# Patient Record
Sex: FEMALE | Race: WHITE | NOT HISPANIC OR LATINO | URBAN - METROPOLITAN AREA
[De-identification: names, ages, dates, MRNs, and addresses within clinical notes are randomized per-mention and may not be internally consistent; named-entity substitution may affect disease eponyms.]

---

## 2022-10-19 ENCOUNTER — INPATIENT (INPATIENT)
Facility: HOSPITAL | Age: 87
LOS: 8 days | Discharge: NURSING, REHAB OR LONG-TERM CARE | End: 2022-10-28
Attending: STUDENT IN AN ORGANIZED HEALTH CARE EDUCATION/TRAINING PROGRAM | Admitting: STUDENT IN AN ORGANIZED HEALTH CARE EDUCATION/TRAINING PROGRAM

## 2022-10-19 VITALS
RESPIRATION RATE: 16 BRPM | OXYGEN SATURATION: 98 % | DIASTOLIC BLOOD PRESSURE: 59 MMHG | SYSTOLIC BLOOD PRESSURE: 100 MMHG | HEART RATE: 42 BPM

## 2022-10-19 DIAGNOSIS — R00.1 BRADYCARDIA, UNSPECIFIED: ICD-10-CM

## 2022-10-19 DIAGNOSIS — I48.91 UNSPECIFIED ATRIAL FIBRILLATION: ICD-10-CM

## 2022-10-19 DIAGNOSIS — Z29.9 ENCOUNTER FOR PROPHYLACTIC MEASURES, UNSPECIFIED: ICD-10-CM

## 2022-10-19 DIAGNOSIS — Z90.89 ACQUIRED ABSENCE OF OTHER ORGANS: Chronic | ICD-10-CM

## 2022-10-19 DIAGNOSIS — E03.9 HYPOTHYROIDISM, UNSPECIFIED: ICD-10-CM

## 2022-10-19 DIAGNOSIS — L98.429 NON-PRESSURE CHRONIC ULCER OF BACK WITH UNSPECIFIED SEVERITY: ICD-10-CM

## 2022-10-19 DIAGNOSIS — T14.8XXA OTHER INJURY OF UNSPECIFIED BODY REGION, INITIAL ENCOUNTER: ICD-10-CM

## 2022-10-19 DIAGNOSIS — D62 ACUTE POSTHEMORRHAGIC ANEMIA: ICD-10-CM

## 2022-10-19 DIAGNOSIS — Z90.710 ACQUIRED ABSENCE OF BOTH CERVIX AND UTERUS: Chronic | ICD-10-CM

## 2022-10-19 DIAGNOSIS — Z90.49 ACQUIRED ABSENCE OF OTHER SPECIFIED PARTS OF DIGESTIVE TRACT: Chronic | ICD-10-CM

## 2022-10-19 LAB
ALBUMIN SERPL ELPH-MCNC: 3.3 G/DL — SIGNIFICANT CHANGE UP (ref 3.3–5)
ALP SERPL-CCNC: 88 U/L — SIGNIFICANT CHANGE UP (ref 40–120)
ALT FLD-CCNC: 23 U/L — SIGNIFICANT CHANGE UP (ref 4–33)
ANION GAP SERPL CALC-SCNC: 12 MMOL/L — SIGNIFICANT CHANGE UP (ref 7–14)
ANION GAP SERPL CALC-SCNC: 7 MMOL/L — SIGNIFICANT CHANGE UP (ref 7–14)
APTT BLD: 31.1 SEC — SIGNIFICANT CHANGE UP (ref 27–36.3)
AST SERPL-CCNC: 41 U/L — HIGH (ref 4–32)
BASE EXCESS BLDV CALC-SCNC: -4.8 MMOL/L — LOW (ref -2–3)
BASOPHILS # BLD AUTO: 0.19 K/UL — SIGNIFICANT CHANGE UP (ref 0–0.2)
BASOPHILS NFR BLD AUTO: 1.2 % — SIGNIFICANT CHANGE UP (ref 0–2)
BILIRUB SERPL-MCNC: 0.8 MG/DL — SIGNIFICANT CHANGE UP (ref 0.2–1.2)
BLD GP AB SCN SERPL QL: NEGATIVE — SIGNIFICANT CHANGE UP
BUN SERPL-MCNC: 34 MG/DL — HIGH (ref 7–23)
BUN SERPL-MCNC: 35 MG/DL — HIGH (ref 7–23)
CA-I SERPL-SCNC: 1.25 MMOL/L — SIGNIFICANT CHANGE UP (ref 1.15–1.33)
CALCIUM SERPL-MCNC: 9.6 MG/DL — SIGNIFICANT CHANGE UP (ref 8.4–10.5)
CALCIUM SERPL-MCNC: 9.8 MG/DL — SIGNIFICANT CHANGE UP (ref 8.4–10.5)
CHLORIDE BLDV-SCNC: 108 MMOL/L — SIGNIFICANT CHANGE UP (ref 96–108)
CHLORIDE SERPL-SCNC: 109 MMOL/L — HIGH (ref 98–107)
CHLORIDE SERPL-SCNC: 111 MMOL/L — HIGH (ref 98–107)
CO2 BLDV-SCNC: 23 MMOL/L — SIGNIFICANT CHANGE UP (ref 22–26)
CO2 SERPL-SCNC: 22 MMOL/L — SIGNIFICANT CHANGE UP (ref 22–31)
CO2 SERPL-SCNC: 23 MMOL/L — SIGNIFICANT CHANGE UP (ref 22–31)
CREAT SERPL-MCNC: 0.92 MG/DL — SIGNIFICANT CHANGE UP (ref 0.5–1.3)
CREAT SERPL-MCNC: 0.97 MG/DL — SIGNIFICANT CHANGE UP (ref 0.5–1.3)
EGFR: 57 ML/MIN/1.73M2 — LOW
EGFR: 61 ML/MIN/1.73M2 — SIGNIFICANT CHANGE UP
EOSINOPHIL # BLD AUTO: 0.38 K/UL — SIGNIFICANT CHANGE UP (ref 0–0.5)
EOSINOPHIL NFR BLD AUTO: 2.4 % — SIGNIFICANT CHANGE UP (ref 0–6)
FLUAV AG NPH QL: SIGNIFICANT CHANGE UP
FLUBV AG NPH QL: SIGNIFICANT CHANGE UP
GAS PNL BLDV: 135 MMOL/L — LOW (ref 136–145)
GAS PNL BLDV: SIGNIFICANT CHANGE UP
GLUCOSE BLDV-MCNC: 93 MG/DL — SIGNIFICANT CHANGE UP (ref 70–99)
GLUCOSE SERPL-MCNC: 112 MG/DL — HIGH (ref 70–99)
GLUCOSE SERPL-MCNC: 98 MG/DL — SIGNIFICANT CHANGE UP (ref 70–99)
HCO3 BLDV-SCNC: 22 MMOL/L — SIGNIFICANT CHANGE UP (ref 22–29)
HCT VFR BLD CALC: 32.1 % — LOW (ref 34.5–45)
HCT VFR BLDA CALC: 30 % — LOW (ref 34.5–46.5)
HGB BLD CALC-MCNC: 10 G/DL — LOW (ref 11.5–15.5)
HGB BLD-MCNC: 9.7 G/DL — LOW (ref 11.5–15.5)
IANC: 12.27 K/UL — HIGH (ref 1.8–7.4)
IMM GRANULOCYTES NFR BLD AUTO: 0.9 % — SIGNIFICANT CHANGE UP (ref 0–0.9)
INR BLD: 1.63 RATIO — HIGH (ref 0.88–1.16)
LACTATE BLDV-MCNC: 2.4 MMOL/L — HIGH (ref 0.5–2)
LYMPHOCYTES # BLD AUTO: 1.87 K/UL — SIGNIFICANT CHANGE UP (ref 1–3.3)
LYMPHOCYTES # BLD AUTO: 11.6 % — LOW (ref 13–44)
MAGNESIUM SERPL-MCNC: 1.7 MG/DL — SIGNIFICANT CHANGE UP (ref 1.6–2.6)
MCHC RBC-ENTMCNC: 26.1 PG — LOW (ref 27–34)
MCHC RBC-ENTMCNC: 30.2 GM/DL — LOW (ref 32–36)
MCV RBC AUTO: 86.3 FL — SIGNIFICANT CHANGE UP (ref 80–100)
MONOCYTES # BLD AUTO: 1.25 K/UL — HIGH (ref 0–0.9)
MONOCYTES NFR BLD AUTO: 7.8 % — SIGNIFICANT CHANGE UP (ref 2–14)
NEUTROPHILS # BLD AUTO: 12.27 K/UL — HIGH (ref 1.8–7.4)
NEUTROPHILS NFR BLD AUTO: 76.1 % — SIGNIFICANT CHANGE UP (ref 43–77)
NRBC # BLD: 0 /100 WBCS — SIGNIFICANT CHANGE UP (ref 0–0)
NRBC # FLD: 0 K/UL — SIGNIFICANT CHANGE UP (ref 0–0)
PCO2 BLDV: 45 MMHG — HIGH (ref 39–42)
PH BLDV: 7.29 — LOW (ref 7.32–7.43)
PLATELET # BLD AUTO: 321 K/UL — SIGNIFICANT CHANGE UP (ref 150–400)
PO2 BLDV: 35 MMHG — SIGNIFICANT CHANGE UP
POTASSIUM BLDV-SCNC: 7.2 MMOL/L — CRITICAL HIGH (ref 3.5–5.1)
POTASSIUM SERPL-MCNC: 4.2 MMOL/L — SIGNIFICANT CHANGE UP (ref 3.5–5.3)
POTASSIUM SERPL-MCNC: 4.5 MMOL/L — SIGNIFICANT CHANGE UP (ref 3.5–5.3)
POTASSIUM SERPL-SCNC: 4.2 MMOL/L — SIGNIFICANT CHANGE UP (ref 3.5–5.3)
POTASSIUM SERPL-SCNC: 4.5 MMOL/L — SIGNIFICANT CHANGE UP (ref 3.5–5.3)
PROT SERPL-MCNC: 6.1 G/DL — SIGNIFICANT CHANGE UP (ref 6–8.3)
PROTHROM AB SERPL-ACNC: 19 SEC — HIGH (ref 10.5–13.4)
RBC # BLD: 3.72 M/UL — LOW (ref 3.8–5.2)
RBC # FLD: 15.8 % — HIGH (ref 10.3–14.5)
RH IG SCN BLD-IMP: POSITIVE — SIGNIFICANT CHANGE UP
RSV RNA NPH QL NAA+NON-PROBE: SIGNIFICANT CHANGE UP
SAO2 % BLDV: 55 % — SIGNIFICANT CHANGE UP
SARS-COV-2 RNA SPEC QL NAA+PROBE: SIGNIFICANT CHANGE UP
SODIUM SERPL-SCNC: 141 MMOL/L — SIGNIFICANT CHANGE UP (ref 135–145)
SODIUM SERPL-SCNC: 143 MMOL/L — SIGNIFICANT CHANGE UP (ref 135–145)
WBC # BLD: 16.11 K/UL — HIGH (ref 3.8–10.5)
WBC # FLD AUTO: 16.11 K/UL — HIGH (ref 3.8–10.5)

## 2022-10-19 PROCEDURE — 11042 DBRDMT SUBQ TIS 1ST 20SQCM/<: CPT | Mod: GC

## 2022-10-19 PROCEDURE — 99232 SBSQ HOSP IP/OBS MODERATE 35: CPT | Mod: GC,25

## 2022-10-19 PROCEDURE — G1004: CPT

## 2022-10-19 PROCEDURE — 99223 1ST HOSP IP/OBS HIGH 75: CPT | Mod: GC

## 2022-10-19 PROCEDURE — 99233 SBSQ HOSP IP/OBS HIGH 50: CPT

## 2022-10-19 PROCEDURE — 99285 EMERGENCY DEPT VISIT HI MDM: CPT

## 2022-10-19 PROCEDURE — 73706 CT ANGIO LWR EXTR W/O&W/DYE: CPT | Mod: 26,50,52,ME

## 2022-10-19 RX ORDER — ACETAMINOPHEN 500 MG
1 TABLET ORAL
Qty: 0 | Refills: 0 | DISCHARGE

## 2022-10-19 RX ORDER — GABAPENTIN 400 MG/1
1 CAPSULE ORAL
Qty: 0 | Refills: 0 | DISCHARGE

## 2022-10-19 RX ORDER — ALPRAZOLAM 0.25 MG
0.25 TABLET ORAL
Refills: 0 | Status: DISCONTINUED | OUTPATIENT
Start: 2022-10-19 | End: 2022-10-19

## 2022-10-19 RX ORDER — GABAPENTIN 400 MG/1
100 CAPSULE ORAL THREE TIMES A DAY
Refills: 0 | Status: DISCONTINUED | OUTPATIENT
Start: 2022-10-19 | End: 2022-10-28

## 2022-10-19 RX ORDER — CALCIUM GLUCONATE 100 MG/ML
2 VIAL (ML) INTRAVENOUS ONCE
Refills: 0 | Status: COMPLETED | OUTPATIENT
Start: 2022-10-19 | End: 2022-10-19

## 2022-10-19 RX ORDER — ALPRAZOLAM 0.25 MG
1 TABLET ORAL
Qty: 0 | Refills: 0 | DISCHARGE

## 2022-10-19 RX ORDER — SENNA PLUS 8.6 MG/1
2 TABLET ORAL AT BEDTIME
Refills: 0 | Status: DISCONTINUED | OUTPATIENT
Start: 2022-10-19 | End: 2022-10-28

## 2022-10-19 RX ORDER — ZINC OXIDE 200 MG/G
1 OINTMENT TOPICAL
Qty: 0 | Refills: 0 | DISCHARGE

## 2022-10-19 RX ORDER — ROSUVASTATIN CALCIUM 5 MG/1
1 TABLET ORAL
Qty: 0 | Refills: 0 | DISCHARGE

## 2022-10-19 RX ORDER — BRIMONIDINE TARTRATE 2 MG/MG
1 SOLUTION/ DROPS OPHTHALMIC
Qty: 0 | Refills: 0 | DISCHARGE

## 2022-10-19 RX ORDER — PSYLLIUM SEED (WITH DEXTROSE)
1 POWDER (GRAM) ORAL
Qty: 0 | Refills: 0 | DISCHARGE

## 2022-10-19 RX ORDER — SODIUM CHLORIDE 9 MG/ML
500 INJECTION, SOLUTION INTRAVENOUS ONCE
Refills: 0 | Status: COMPLETED | OUTPATIENT
Start: 2022-10-19 | End: 2022-10-19

## 2022-10-19 RX ORDER — KETOROLAC TROMETHAMINE 0.5 %
1 DROPS OPHTHALMIC (EYE)
Qty: 0 | Refills: 0 | DISCHARGE

## 2022-10-19 RX ORDER — ACETAMINOPHEN 500 MG
650 TABLET ORAL EVERY 6 HOURS
Refills: 0 | Status: DISCONTINUED | OUTPATIENT
Start: 2022-10-19 | End: 2022-10-28

## 2022-10-19 RX ORDER — DORZOLAMIDE HYDROCHLORIDE 20 MG/ML
1 SOLUTION/ DROPS OPHTHALMIC ONCE
Refills: 0 | Status: COMPLETED | OUTPATIENT
Start: 2022-10-19 | End: 2022-10-19

## 2022-10-19 RX ORDER — DORZOLAMIDE HYDROCHLORIDE 20 MG/ML
1 SOLUTION/ DROPS OPHTHALMIC THREE TIMES A DAY
Refills: 0 | Status: DISCONTINUED | OUTPATIENT
Start: 2022-10-19 | End: 2022-10-28

## 2022-10-19 RX ORDER — INSULIN HUMAN 100 [IU]/ML
5 INJECTION, SOLUTION SUBCUTANEOUS ONCE
Refills: 0 | Status: DISCONTINUED | OUTPATIENT
Start: 2022-10-19 | End: 2022-10-19

## 2022-10-19 RX ORDER — LOSARTAN POTASSIUM 100 MG/1
1 TABLET, FILM COATED ORAL
Qty: 0 | Refills: 0 | DISCHARGE

## 2022-10-19 RX ORDER — ALPRAZOLAM 0.25 MG
0.25 TABLET ORAL
Refills: 0 | Status: DISCONTINUED | OUTPATIENT
Start: 2022-10-19 | End: 2022-10-26

## 2022-10-19 RX ORDER — NYSTATIN CREAM 100000 [USP'U]/G
1 CREAM TOPICAL
Qty: 0 | Refills: 0 | DISCHARGE

## 2022-10-19 RX ORDER — BRIMONIDINE TARTRATE 2 MG/MG
1 SOLUTION/ DROPS OPHTHALMIC ONCE
Refills: 0 | Status: COMPLETED | OUTPATIENT
Start: 2022-10-19 | End: 2022-10-19

## 2022-10-19 RX ORDER — SENNA PLUS 8.6 MG/1
2 TABLET ORAL
Qty: 0 | Refills: 0 | DISCHARGE

## 2022-10-19 RX ORDER — LANOLIN ALCOHOL/MO/W.PET/CERES
1 CREAM (GRAM) TOPICAL
Qty: 0 | Refills: 0 | DISCHARGE

## 2022-10-19 RX ORDER — ATORVASTATIN CALCIUM 80 MG/1
80 TABLET, FILM COATED ORAL AT BEDTIME
Refills: 0 | Status: DISCONTINUED | OUTPATIENT
Start: 2022-10-19 | End: 2022-10-28

## 2022-10-19 RX ORDER — SODIUM CHLORIDE 9 MG/ML
1000 INJECTION, SOLUTION INTRAVENOUS ONCE
Refills: 0 | Status: DISCONTINUED | OUTPATIENT
Start: 2022-10-19 | End: 2022-10-19

## 2022-10-19 RX ORDER — LEVOTHYROXINE SODIUM 125 MCG
50 TABLET ORAL DAILY
Refills: 0 | Status: DISCONTINUED | OUTPATIENT
Start: 2022-10-19 | End: 2022-10-28

## 2022-10-19 RX ORDER — BRINZOLAMIDE 10 MG/ML
1 SUSPENSION/ DROPS OPHTHALMIC
Qty: 0 | Refills: 0 | DISCHARGE

## 2022-10-19 RX ORDER — DEXTROSE 50 % IN WATER 50 %
25 SYRINGE (ML) INTRAVENOUS ONCE
Refills: 0 | Status: DISCONTINUED | OUTPATIENT
Start: 2022-10-19 | End: 2022-10-19

## 2022-10-19 RX ORDER — LEVOTHYROXINE SODIUM 125 MCG
1 TABLET ORAL
Qty: 0 | Refills: 0 | DISCHARGE

## 2022-10-19 RX ORDER — KETOROLAC TROMETHAMINE 0.5 %
1 DROPS OPHTHALMIC (EYE) AT BEDTIME
Refills: 0 | Status: DISCONTINUED | OUTPATIENT
Start: 2022-10-19 | End: 2022-10-28

## 2022-10-19 RX ORDER — LANOLIN ALCOHOL/MO/W.PET/CERES
3 CREAM (GRAM) TOPICAL AT BEDTIME
Refills: 0 | Status: DISCONTINUED | OUTPATIENT
Start: 2022-10-19 | End: 2022-10-28

## 2022-10-19 RX ORDER — BRIMONIDINE TARTRATE 2 MG/MG
1 SOLUTION/ DROPS OPHTHALMIC THREE TIMES A DAY
Refills: 0 | Status: DISCONTINUED | OUTPATIENT
Start: 2022-10-19 | End: 2022-10-28

## 2022-10-19 RX ADMIN — DORZOLAMIDE HYDROCHLORIDE 1 DROP(S): 20 SOLUTION/ DROPS OPHTHALMIC at 21:28

## 2022-10-19 RX ADMIN — BRIMONIDINE TARTRATE 1 DROP(S): 2 SOLUTION/ DROPS OPHTHALMIC at 21:27

## 2022-10-19 RX ADMIN — DORZOLAMIDE HYDROCHLORIDE 1 DROP(S): 20 SOLUTION/ DROPS OPHTHALMIC at 16:58

## 2022-10-19 RX ADMIN — BRIMONIDINE TARTRATE 1 DROP(S): 2 SOLUTION/ DROPS OPHTHALMIC at 16:52

## 2022-10-19 RX ADMIN — SODIUM CHLORIDE 250 MILLILITER(S): 9 INJECTION, SOLUTION INTRAVENOUS at 12:59

## 2022-10-19 RX ADMIN — SODIUM CHLORIDE 500 MILLILITER(S): 9 INJECTION, SOLUTION INTRAVENOUS at 21:25

## 2022-10-19 RX ADMIN — Medication 200 GRAM(S): at 12:58

## 2022-10-19 NOTE — H&P ADULT - PROBLEM SELECTOR PLAN 2
Hx of Afib on eliquis  not rate/ rhythm controlled iso chronic bradycardia    Plan:  -Hold Eliquis iso bleed  -CTM on tele

## 2022-10-19 NOTE — H&P ADULT - SOCIAL HISTORY: ALCOHOL USE
Moderate (0-"a few" drinks weeklyprior to last admission) Moderate (0-"a few" drinks weekly prior to last admission)

## 2022-10-19 NOTE — H&P ADULT - PROBLEM SELECTOR PLAN 4
Decubitus ulcer overlying the distal sacrum/coccyx. No obvious bony erosion,    Plan  -F/U wound care reqs  -Consider MRI if concern for OM heightens  -CTM

## 2022-10-19 NOTE — H&P ADULT - ASSESSMENT
87F PMHx afib (eliquis), HLD, hypothyroid, bradycardia, leg + sacral ulcers presents from [facility] with open bleeding wound.    Impression: Bleeding wound on Eliquis iso hx of leg ulcers and recent PT work

## 2022-10-19 NOTE — ED ADULT NURSE NOTE - CHIEF COMPLAINT QUOTE
Pt brought in by EMS from [facility] for L lower leg bleeding that started earlier Pt takes Eliquis daily. Pt brought to [facility]. unable to obtain temp in triage.

## 2022-10-19 NOTE — H&P ADULT - PROBLEM SELECTOR PLAN 6
DV ppx: not indicated iso bleed and leg wounds     Dispo: Likely back to [facility] can consider PT consult as wound improves    ##Opthalmic condition  Unclear hx per pt    Plan  -Obtain collateral, continue home meds

## 2022-10-19 NOTE — ED PROVIDER NOTE - OBJECTIVE STATEMENT
87F w/hx afib on eliquis, HLD, hypothyroid, bradycardia p/w acute onset large volume lower extremity bleeding. She reports noting LLE tightness and pain after physical therapy yesterday. Today, she reports noticing that her LLE had become swollen and painful, alongside pain and stiffness in the L foot. She reports that the skin suddenly ruptured and began to bleed quickly, and was transported to the ED. She is currently residing at [facility] for vascular care, but she is unsure of the details. She reports that the pain in her foot improved after the bleeding began. She denies any nausea, vomiting, fevers, chills, chest pain, or prior similar episodes. She denies any numbness or changes in sensation of the foot or leg.

## 2022-10-19 NOTE — ED ADULT NURSE NOTE - OBJECTIVE STATEMENT
pt brought back to trauma C by EMS from [facility] w/ times of confusion PMH HTN, HLD, bradycardia , w/ c/o left leg hematoma. As per EMS pts hematoma opened today on its own. Bilateral pedal pulses on doppler. Pt hypotensive, bradycardic (30s). 20G IV placed in the RAC and 20G IV placed in the LAC. Labs drawn and sent. Medicated as per order. Vascular at  bedside to drain hematoma. Recommended blood transfusion to help with hypotension and blood loss. 500cc of blood loss during debridement of wound noted.

## 2022-10-19 NOTE — ED PROVIDER NOTE - CLINICAL SUMMARY MEDICAL DECISION MAKING FREE TEXT BOX
87F w/hx afib on eliquis, HLD, hypothyroid, bradycardia p/w acute onset large volume lower extremity bleeding, controlled with surgicell and gauze, with large exposed hematoma with active bleeding, with diminished distal pulses but soft compartments at thi stime.    Plan:   CTA lower extremity to eval vascular injury  Surgical consult  Labs  EKG  COVID-19 swab    Dispo:   Admit pending imaging, surgical evaluation

## 2022-10-19 NOTE — ED PROVIDER NOTE - PHYSICAL EXAMINATION
Gen: No acute distress  HEENT: NCAT, EOMI, PERRLA,  mucous membranes moist  CV: RRR, S1S2  Resp: CTAB  GI: Abdomen soft, NT, ND. No rebound, no guarding.  : No CVAT  Neuro: A&Ox3, slow to answer questions, appears intermittently confused. no motor or sensory deficits, no focal deficits.   MSK: Large ruptured hematoma extending from L midthigh to knee. Oozing blood. Soft compartments. Weak distal pulses, with audible DP and PT pulses on doppler. No midline spinal tenderness.  Skin: Sacral pressure ulcer. Bleeding wound as described above. Warm, dry. No rashes. Gen: No acute distress  HEENT: NCAT, EOMI, PERRLA,  mucous membranes moist  CV: RRR, S1S2  Resp: CTAB  GI: Abdomen soft, NT, ND. No rebound, no guarding.  : No CVAT  Neuro: A&Ox3, slow to answer questions, appears intermittently confused. no motor or sensory deficits, no focal deficits.   MSK: Large ruptured hematoma extending from lateral L mid calf towards knee, not involving joint. Oozing blood. Soft compartments. Weak distal pulses, with audible DP and PT pulses on doppler. No midline spinal tenderness.  Skin: Sacral pressure ulcer. Bleeding wound as described above. Warm, dry. No rashes.

## 2022-10-19 NOTE — H&P ADULT - HISTORY OF PRESENT ILLNESS
87F PMHx afib (eliquis), HLD, hypothyroid, bradycardia, leg + sacral ulcers presents from [place] after c/o LLE tightness and pain after a PT 2 days ago.  Since then has noted tightness and pain. Today, she reports noticing that her LLE had become swollen and painful, alongside pain and stiffness in the L foot then skin suddenly ruptured and began to bleed quickly, and was transported to the ED. LLE was found to have ruptured and bled.  Was seen by surgery and underwent debridement with 300-400cc clot evacuated.  Noted on telemetry and EKG that patient was bradycardic HR 30-40's.  Denies any chest pain/SOB/Palp/Lightheadedness/Syncope. Denies additional bleeding, BRBPR, hematuria, hematochezia. Denies f/c/dysuria      Pt was ambulatory and independent in ADLS (walked with walker) until recent admission to [facility] for ulcers. Since that admission has been at rehab in [facility]. No B/B incontininece but uses bedpan recently as limited by jah      PMHx: afib (eliquis), HLD, hypothyroid, bradycardia, leg + sacral ulcer, opthalmic conditions  PSHx: Tonsillectomy appendectomy, hysterectomy  Allergies: PCN (rash, as adult) 87F PMHx afib (eliquis), HLD, hypothyroid, bradycardia, leg + sacral ulcers presents from [facility] after c/o LLE tightness and pain after a PT 2 days ago.  Since then has noted tightness and pain. Today, she reports noticing that her LLE had become swollen and painful, alongside pain and stiffness in the L foot then skin suddenly ruptured and began to bleed quickly, and was transported to the ED. LLE was found to have ruptured and bled.  Was seen by surgery and underwent debridement with 300-400cc clot evacuated.  Noted on telemetry and EKG that patient was bradycardic HR 30-40's.  Denies any chest pain/SOB/Palp/Lightheadedness/Syncope. Denies additional bleeding, BRBPR, hematuria, hematochezia. Denies f/c/dysuria      Pt was ambulatory and independent in ADLS (walked with walker) until recent admission to [facility] for ulcers. Since that admission has been at rehab in [facility]. No B/B incontininece but uses bedpan recently as limited by jah    PMHx: afib (eliquis), HLD, hypothyroid, bradycardia, leg + sacral ulcer, opthalmic conditions  PSHx: Tonsillectomy appendectomy, hysterectomy  Allergies: PCN (rash, as adult)

## 2022-10-19 NOTE — H&P ADULT - PROBLEM SELECTOR PLAN 1
Picture in Surgery Consult note 10/19/22  S/P Debridement with surgery  Wound care consulted  Surgery following  S/P 1 unit blood    Plan  -F/U WC recs  -F/U Surgery recs  -Daily dressing changes  -Will consider plastic consult as wound improves for closure

## 2022-10-19 NOTE — H&P ADULT - NSHPSOCIALHISTORY_GEN_ALL_CORE
Currently residing in [place] Currently residing in [place], was residing at home with daughter and grandkids prior to that  Not working

## 2022-10-19 NOTE — H&P ADULT - ATTENDING COMMENTS
Patient seen and examined at bedside. 10/19/22 Evening.   LLE hematoma over tibia, s/p beside debridement 10/19, low concern for compartment syndrome as per surgery, wound care and plastics consult, monitor hgb, holding anticoagulation, pain control   Afib w/ SVR, holding Eliquis d/t hematoma, monitor HR.

## 2022-10-19 NOTE — H&P ADULT - NSHPLABSRESULTS_GEN_ALL_CORE
9.7    16.11 )-----------( 321      ( 19 Oct 2022 10:38 )             32.1     141  |  111<H>  |  35<H>  ----------------------------<  112<H>  4.2   |  23  |  0.97    Ca    9.6      19 Oct 2022 14:06  Mg     1.70     10-19  TPro  6.1  /  Alb  3.3  /  TBili  0.8  /  DBili  x   /  AST  41<H>  /  ALT  23  /  AlkPhos  88  10-19  PT/INR - ( 19 Oct 2022 10:38 )   PT: 19.0 sec;   INR: 1.63 ratio    PTT - ( 19 Oct 2022 10:38 )  PTT:31.1 sec  CAPILLARY BLOOD GLUCOSE    Blood Gas Profile - Venous (10.19.22 @ 10:38)   pH, Venous: 7.29   pCO2, Venous: 45 mmHg   pO2, Venous: 35 mmHg   HCO3, Venous: 22 mmol/L   Base Excess, Venous: -4.8 mmol/L   Oxygen Saturation, Venous: 55.0 %   Total CO2, Venous: 23.0 mmol/L     RADS:  < from: CT Angio Lower Extremity w/ IV Cont, Bilateral (10.19.22 @ 13:06) >    FINDINGS:        CENTRAL ARTERIAL SYSTEM:    Atherosclerotic changes. Visualized distal abdominal aorta is patent.   Common iliac and external iliac arteries are patent. Mild stenosis of the   bilateral internal iliac arteries.    RIGHT LOWER EXTREMITY:    Atherosclerotic changes. Right common femoral artery is patent. Mild   stenosis of the proximal superficial femoral artery. Poor opacification   of the distal superficial femoral artery. Popliteal and infrapopliteal   arteries are not opacified.    LEFT LOWER EXTREMITY:    Atherosclerotic changes. Left common femoral artery is patent.   Superficial femoral and deep femoral arteries are patent. Mild stenosis   of the popliteal artery. Anterior tibial artery, posterior tibial artery,   and peroneal artery are patent proximally and are poorly opacified   distally.    ADDITIONAL FINDINGS: Colonic diverticulosis. Bilateral lower extremity   skin thickening and subcutaneous edema. Large soft tissue defect in the   left anterior calf.    IMPRESSION:  Technically limited examination due to poor opacification of the arteries   in the distal lower extremities as above, which may be due to poor   cardiac output. Delayed phase imaging was not performed. Occlusion cannot   be excluded.    Bilateral lower extremity skin thickening and subcutaneous edema. Large   soft tissue defect in the left anterior calf.      < end of copied text >

## 2022-10-19 NOTE — H&P ADULT - NSHPPHYSICALEXAM_GEN_ALL_CORE
T(C): 36.2 (10-19-22 @ 16:07), Max: 36.4 (10-19-22 @ 13:15)  HR: 34 (10-19-22 @ 17:01) (32 - 42)  BP: 123/60 (10-19-22 @ 17:01) (72/53 - 145/56)  RR: 14 (10-19-22 @ 17:01) (14 - 17)  SpO2: 100% (10-19-22 @ 17:01) (97% - 100%)    CONSTITUTIONAL: Laying flat, NAD  EYES: PERRL, EOM grossly Intact, No conjunctival or scleral injection, non-icteric  ENMT: Oral mucosa with moist membranes.   RESP: No respiratory distress, no use of accessory muscles; CTA b/l, no WRR  CV: RRR, +S1S2, no MRG;  no peripheral edema  GI: Soft, NT, ND, no rebound, no guarding; no palpable masses; no hepatosplenomegaly; no hernia palpated  LYMPH: No cervical LAD or tenderness; no axillary LAD or tenderness; no inguinal LAD or tenderness  SKIN: L leg wrapped. R foot wrapped. Scattered Ecchymosis diffusely on NANI   Vasc: Palp PT DP on L, Right foot covered  NEURO: Awake alert orientedx3. Speech clear and fluent.  PSYCH: Appropriate insight/judgment; A+O x 3, mood and affect appropriate, recent/remote memory intact

## 2022-10-19 NOTE — ED PROVIDER NOTE - ATTENDING CONTRIBUTION TO CARE
88 yo f past medical history afib on eliquis, hld , hypothyroidism, chronic bradycardia, chronic le wounds, sacral decub sent  from SNF for LLE swelling and bleeding. unclear if preceding trauma, patient thinks may have been pushing leg on device during PT several days ago. bleeding started this morning. denies fever chills cp sob abd pain new back pain. denies numbness/weakness. exam as above. pt hypotensive suspect 2/2  bleeding, poss 2/2 bradycardia but reported to have chronic иван (daughter reports neg work up several yrs ago). suspect venous>>>arterial bleeding. bleeding controlled with surgicell/ gauze compression. cta to assess for ruptured vessel. surgery consulted. labs. symptom relief prn. given size/volume of debrided hematoma  and hypotension likely 2/2 hemorrhage pt transfused. daughter updated throughout ED stay.

## 2022-10-19 NOTE — ED ADULT NURSE NOTE - NSSEPSISNEWALTERMENTAL_ED_A_ED
INR--1.3 (goal 2.0-3.0)     Last INR: 1.0 on 8/17/22     Recent warfarin dosing history:   *50mg TWD*  8/12 - 5mg  8/13 - 5mg  8/14 - 5mg  8/15 - 7.5mg (1.0)  8/16 - 7.5mg  8/17 - 10mg (1.0)  8/18 - 10mg     Please advise regarding:  - Warfarin dosing instructions--continue 10mg nightly  - Date of next INR--8/22/22    Notes:  - Has 5mg tablets of warfarin on hand       No

## 2022-10-19 NOTE — ED ADULT NURSE REASSESSMENT NOTE - NS ED NURSE REASSESS COMMENT FT1
pts bleeding controlled. Blood transfusion started at 1300. pt VS stable as per flow sheet. no transfusion reaction noted. comfort measures provided. Pt remains bradycardic on monitor.
pt resting comfortably in bed. Blood transfusion completed. pts BP improved. Bradycardic on monitor medicated as ordered. comfort measures.

## 2022-10-19 NOTE — PROCEDURE NOTE - ADDITIONAL PROCEDURE DETAILS
Blood clot debrided from skin tear site with old clot expressed from anterior and posterior pocket. About 300-400 cc of old clot evacuated from wound. Wound packed with surgicel and wet to dry dressing applied. Leg wrap with kerlex and ACE bandage for compression.

## 2022-10-19 NOTE — CONSULT NOTE ADULT - NS ATTEND AMEND GEN_ALL_CORE FT
87yoF w/ PMHx afib on eliquis, HLD, hypothyroid presents from [facility] after c/o LLE tightness and pain after a PT session.  LLE was found to have ruptured and bled.  Was seen by surgery and underwent debridement with 300-400cc clot evacuated.  Noted on telemetry and EKG that patient was bradycardic HR 30-40's.  As per patient, was told a long time ago (does not remember when) that her heart rate was slow.  Denies any chest pain, palpitations, lightheadedness, dizziness, SOB and syncope or near syncope. Avoid AVN blockers, will follow on tele.

## 2022-10-19 NOTE — ED PROVIDER NOTE - NS ED ROS FT
Constitutional: no fevers, no chills.  Eyes: no visual changes.  Ears: no ear drainage, no ear pain.  Nose: no nasal congestion.  Mouth/Throat: no sore throat.  Cardiovascular: no chest pain.  Respiratory: no shortness of breath, no wheezing, no cough  Gastrointestinal: no nausea, vomiting, diarrhea or abdominal pain.  MSK: Leg pain, bleeding. no flank pain, no back pain.  Genitourinary: no dysuria, no hematuria.  Skin: no rashes.  Neuro: no headache, normal gait.  Psychiatric: no known mental health issues.

## 2022-10-19 NOTE — ED ADULT NURSE NOTE - NSIMPLEMENTINTERV_GEN_ALL_ED
Implemented All Fall with Harm Risk Interventions:  [place] to call system. Call bell, personal items and telephone within reach. Instruct patient to call for assistance. Room bathroom lighting operational. Non-slip footwear when patient is off stretcher. Physically safe environment: no spills, clutter or unnecessary equipment. Stretcher in lowest position, wheels locked, appropriate side rails in place. Provide visual cue, wrist band, yellow gown, etc. Monitor gait and stability. Monitor for mental status changes and reorient to person, place, and time. Review medications for side effects contributing to fall risk. Reinforce activity limits and safety measures with patient and family. Provide visual clues: red socks.

## 2022-10-19 NOTE — CONSULT NOTE ADULT - ATTENDING COMMENTS
Debride wound to remove hematoma  Daily dressing changes  May need plastic for coverage once wound evaluated for a few weeks   Wound care consult     Srikanth Mead MD  Acute and Critical Care Surgery    The Acute Care Surgery (B Team) Attending Group Practice:  Dr. Rl Mederos, Dr. Thiago Monaco, Dr. Srikanth Mead,  Dr. Ruperto Leung and Dr. Sheron Balderrama     Urgent issues - spectra [id number] or [id number]  Nonurgent issues - [phone]  Patient appointments or after hours - [phone]

## 2022-10-19 NOTE — H&P ADULT - NSICDXPASTSURGICALHX_GEN_ALL_CORE_FT
PAST SURGICAL HISTORY:  History of appendectomy     History of hysterectomy     History of tonsillectomy

## 2022-10-19 NOTE — ED ADULT TRIAGE NOTE - CHIEF COMPLAINT QUOTE
Pt brought in by EMS from [facility] for L lower leg bleeding that started earlier Pt takes Eliquis daily. Pt brought to [facility]. unable to obtain temp in triage.

## 2022-10-20 LAB
ALBUMIN SERPL ELPH-MCNC: 3.1 G/DL — LOW (ref 3.3–5)
ALP SERPL-CCNC: 84 U/L — SIGNIFICANT CHANGE UP (ref 40–120)
ALT FLD-CCNC: 14 U/L — SIGNIFICANT CHANGE UP (ref 4–33)
ANION GAP SERPL CALC-SCNC: 11 MMOL/L — SIGNIFICANT CHANGE UP (ref 7–14)
AST SERPL-CCNC: 23 U/L — SIGNIFICANT CHANGE UP (ref 4–32)
BASOPHILS # BLD AUTO: 0.17 K/UL — SIGNIFICANT CHANGE UP (ref 0–0.2)
BASOPHILS NFR BLD AUTO: 1.3 % — SIGNIFICANT CHANGE UP (ref 0–2)
BILIRUB SERPL-MCNC: 1.2 MG/DL — SIGNIFICANT CHANGE UP (ref 0.2–1.2)
BUN SERPL-MCNC: 32 MG/DL — HIGH (ref 7–23)
CALCIUM SERPL-MCNC: 9.8 MG/DL — SIGNIFICANT CHANGE UP (ref 8.4–10.5)
CHLORIDE SERPL-SCNC: 107 MMOL/L — SIGNIFICANT CHANGE UP (ref 98–107)
CO2 SERPL-SCNC: 22 MMOL/L — SIGNIFICANT CHANGE UP (ref 22–31)
CREAT SERPL-MCNC: 0.91 MG/DL — SIGNIFICANT CHANGE UP (ref 0.5–1.3)
EGFR: 61 ML/MIN/1.73M2 — SIGNIFICANT CHANGE UP
EOSINOPHIL # BLD AUTO: 0.19 K/UL — SIGNIFICANT CHANGE UP (ref 0–0.5)
EOSINOPHIL NFR BLD AUTO: 1.4 % — SIGNIFICANT CHANGE UP (ref 0–6)
GLUCOSE BLDC GLUCOMTR-MCNC: 102 MG/DL — HIGH (ref 70–99)
GLUCOSE SERPL-MCNC: 88 MG/DL — SIGNIFICANT CHANGE UP (ref 70–99)
HCT VFR BLD CALC: 28.4 % — LOW (ref 34.5–45)
HCT VFR BLD CALC: 29.1 % — LOW (ref 34.5–45)
HGB BLD-MCNC: 9 G/DL — LOW (ref 11.5–15.5)
HGB BLD-MCNC: 9.3 G/DL — LOW (ref 11.5–15.5)
IANC: 10.32 K/UL — HIGH (ref 1.8–7.4)
IMM GRANULOCYTES NFR BLD AUTO: 0.8 % — SIGNIFICANT CHANGE UP (ref 0–0.9)
LYMPHOCYTES # BLD AUTO: 1.37 K/UL — SIGNIFICANT CHANGE UP (ref 1–3.3)
LYMPHOCYTES # BLD AUTO: 10.4 % — LOW (ref 13–44)
MAGNESIUM SERPL-MCNC: 1.9 MG/DL — SIGNIFICANT CHANGE UP (ref 1.6–2.6)
MCHC RBC-ENTMCNC: 26.6 PG — LOW (ref 27–34)
MCHC RBC-ENTMCNC: 27 PG — SIGNIFICANT CHANGE UP (ref 27–34)
MCHC RBC-ENTMCNC: 31.7 GM/DL — LOW (ref 32–36)
MCHC RBC-ENTMCNC: 32 GM/DL — SIGNIFICANT CHANGE UP (ref 32–36)
MCV RBC AUTO: 83.4 FL — SIGNIFICANT CHANGE UP (ref 80–100)
MCV RBC AUTO: 85.3 FL — SIGNIFICANT CHANGE UP (ref 80–100)
MONOCYTES # BLD AUTO: 1.06 K/UL — HIGH (ref 0–0.9)
MONOCYTES NFR BLD AUTO: 8 % — SIGNIFICANT CHANGE UP (ref 2–14)
NEUTROPHILS # BLD AUTO: 10.32 K/UL — HIGH (ref 1.8–7.4)
NEUTROPHILS NFR BLD AUTO: 78.1 % — HIGH (ref 43–77)
NRBC # BLD: 0 /100 WBCS — SIGNIFICANT CHANGE UP (ref 0–0)
NRBC # BLD: 0 /100 WBCS — SIGNIFICANT CHANGE UP (ref 0–0)
NRBC # FLD: 0 K/UL — SIGNIFICANT CHANGE UP (ref 0–0)
NRBC # FLD: 0 K/UL — SIGNIFICANT CHANGE UP (ref 0–0)
PHOSPHATE SERPL-MCNC: 4 MG/DL — SIGNIFICANT CHANGE UP (ref 2.5–4.5)
PLATELET # BLD AUTO: 230 K/UL — SIGNIFICANT CHANGE UP (ref 150–400)
PLATELET # BLD AUTO: 251 K/UL — SIGNIFICANT CHANGE UP (ref 150–400)
POTASSIUM SERPL-MCNC: 4.1 MMOL/L — SIGNIFICANT CHANGE UP (ref 3.5–5.3)
POTASSIUM SERPL-SCNC: 4.1 MMOL/L — SIGNIFICANT CHANGE UP (ref 3.5–5.3)
PROT SERPL-MCNC: 5.7 G/DL — LOW (ref 6–8.3)
RBC # BLD: 3.33 M/UL — LOW (ref 3.8–5.2)
RBC # BLD: 3.49 M/UL — LOW (ref 3.8–5.2)
RBC # FLD: 15.4 % — HIGH (ref 10.3–14.5)
RBC # FLD: 15.5 % — HIGH (ref 10.3–14.5)
SODIUM SERPL-SCNC: 140 MMOL/L — SIGNIFICANT CHANGE UP (ref 135–145)
WBC # BLD: 11.49 K/UL — HIGH (ref 3.8–10.5)
WBC # BLD: 13.21 K/UL — HIGH (ref 3.8–10.5)
WBC # FLD AUTO: 11.49 K/UL — HIGH (ref 3.8–10.5)
WBC # FLD AUTO: 13.21 K/UL — HIGH (ref 3.8–10.5)

## 2022-10-20 PROCEDURE — 99221 1ST HOSP IP/OBS SF/LOW 40: CPT

## 2022-10-20 PROCEDURE — 99232 SBSQ HOSP IP/OBS MODERATE 35: CPT | Mod: GC

## 2022-10-20 PROCEDURE — 99232 SBSQ HOSP IP/OBS MODERATE 35: CPT

## 2022-10-20 RX ADMIN — GABAPENTIN 100 MILLIGRAM(S): 400 CAPSULE ORAL at 14:34

## 2022-10-20 RX ADMIN — GABAPENTIN 100 MILLIGRAM(S): 400 CAPSULE ORAL at 07:23

## 2022-10-20 RX ADMIN — Medication 5 MILLIGRAM(S): at 18:09

## 2022-10-20 RX ADMIN — BRIMONIDINE TARTRATE 1 DROP(S): 2 SOLUTION/ DROPS OPHTHALMIC at 14:34

## 2022-10-20 RX ADMIN — BRIMONIDINE TARTRATE 1 DROP(S): 2 SOLUTION/ DROPS OPHTHALMIC at 07:23

## 2022-10-20 RX ADMIN — Medication 1 TABLET(S): at 14:34

## 2022-10-20 RX ADMIN — GABAPENTIN 100 MILLIGRAM(S): 400 CAPSULE ORAL at 21:06

## 2022-10-20 RX ADMIN — Medication 0.25 MILLIGRAM(S): at 07:22

## 2022-10-20 RX ADMIN — SENNA PLUS 2 TABLET(S): 8.6 TABLET ORAL at 21:06

## 2022-10-20 RX ADMIN — Medication 1 DROP(S): at 21:09

## 2022-10-20 RX ADMIN — Medication 0.25 MILLIGRAM(S): at 18:08

## 2022-10-20 RX ADMIN — DORZOLAMIDE HYDROCHLORIDE 1 DROP(S): 20 SOLUTION/ DROPS OPHTHALMIC at 14:36

## 2022-10-20 RX ADMIN — BRIMONIDINE TARTRATE 1 DROP(S): 2 SOLUTION/ DROPS OPHTHALMIC at 21:07

## 2022-10-20 RX ADMIN — ATORVASTATIN CALCIUM 80 MILLIGRAM(S): 80 TABLET, FILM COATED ORAL at 21:06

## 2022-10-20 RX ADMIN — DORZOLAMIDE HYDROCHLORIDE 1 DROP(S): 20 SOLUTION/ DROPS OPHTHALMIC at 07:23

## 2022-10-20 RX ADMIN — Medication 3 MILLIGRAM(S): at 21:06

## 2022-10-20 RX ADMIN — Medication 50 MICROGRAM(S): at 07:22

## 2022-10-20 RX ADMIN — DORZOLAMIDE HYDROCHLORIDE 1 DROP(S): 20 SOLUTION/ DROPS OPHTHALMIC at 21:07

## 2022-10-20 NOTE — PROGRESS NOTE ADULT - SUBJECTIVE AND OBJECTIVE BOX
SURGERY DAILY PROGRESS NOTE:     SUBJECTIVE/ROS: No acute events overnight. Patient seen and examined bedside on AM rounds. Evacuated 3-400cc clot yesterday L shin. Compression dressing in place. No c/o pain.     OBJECTIVE:  Vital Signs Last 24 Hrs  T(C): 36.3 (20 Oct 2022 07:15), Max: 36.4 (19 Oct 2022 13:15)  T(F): 97.4 (20 Oct 2022 07:15), Max: 97.5 (19 Oct 2022 13:15)  HR: 46 (20 Oct 2022 07:15) (28 - 46)  BP: 124/48 (20 Oct 2022 07:15) (72/53 - 145/56)  BP(mean): 78 (20 Oct 2022 01:33) (55 - 80)  RR: 18 (20 Oct 2022 07:15) (13 - 20)  SpO2: 98% (20 Oct 2022 07:15) (95% - 100%)    Parameters below as of 20 Oct 2022 07:15  Patient On (Oxygen Delivery Method): room air    PHYSICAL EXAM:  Constitutional: disoriented  Respiratory: non-labored breathing, patent airway  Gastrointestinal: abdomen soft, nontender, nondistended  Extremities:   - LLE: PE to be updated s/p dressing change                         9.3    13.21 )-----------( 251      ( 20 Oct 2022 05:58 )             29.1     10-20    140  |  107  |  32<H>  ----------------------------<  88  4.1   |  22  |  0.91    Ca    9.8      20 Oct 2022 05:58  Phos  4.0     10-20  Mg     1.90     10-20    TPro  5.7<L>  /  Alb  3.1<L>  /  TBili  1.2  /  DBili  x   /  AST  23  /  ALT  14  /  AlkPhos  84  10-20   PT/INR - ( 19 Oct 2022 10:38 )   PT: 19.0 sec;   INR: 1.63 ratio         PTT - ( 19 Oct 2022 10:38 )  PTT:31.1 sec  I&O's Detail

## 2022-10-20 NOTE — CONSULT NOTE ADULT - SUBJECTIVE AND OBJECTIVE BOX
Date of Admission: 10/19/2022    CHIEF COMPLAINT: LE hematoma     HISTORY OF PRESENT ILLNESS:  This is a 87yoF w/ PMHx afib on eliquis, HLD, hypothyroid presents from NH after c/o LLE tightness and pain after a PT session.  LLE was found to have ruptured and bled.  Was seen by surgery and underwent debridement with 300-400cc clot evacuated.  Noted on telemetry and EKG that patient was bradycardic HR 30-40's.  As per patient, was told a long time ago (does not remember when) that her heart rate was slow.  Denies any chest pain, palpitations, lightheadedness, dizziness, SOB and syncope or near syncope.      Allergies  penicillin (Unknown)  Intolerances    MEDICATIONS:    PAST MEDICAL & SURGICAL HISTORY:  Afib    FAMILY HISTORY:    REVIEW OF SYSTEMS:  See HPI. Otherwise, 10 point ROS done and otherwise negative.    PHYSICAL EXAM:  T(C): 36.4 (10-19-22 @ 13:15), Max: 36.4 (10-19-22 @ 13:15)  HR: 33 (10-19-22 @ 13:15) (33 - 42)  BP: 99/44 (10-19-22 @ 13:15) (72/53 - 117/50)  RR: 16 (10-19-22 @ 13:15) (16 - 16)  SpO2: 97% (10-19-22 @ 13:15) (97% - 100%)  Wt(kg): --  I&O's Summary    Appearance: Normal	  HEENT:   Normal oral mucosa, PERRL, EOMI	  Lymphatic: No lymphadenopathy  Cardiovascular: Normal S1 S2, No JVD, No murmurs, No edema  Respiratory: Lungs clear to auscultation	  Psychiatry: A & O x 3, Mood & affect appropriate  Gastrointestinal:  Soft, Non-tender, + BS	  Skin: No rashes, No ecchymoses, No cyanosis	  Neurologic: Non-focal  Extremities: Normal range of motion, No clubbing, cyanosis or edema  Vascular: Peripheral pulses palpable 2+ bilaterally    LABS:	 	  CBC Full  -  ( 19 Oct 2022 10:38 )  WBC Count : 16.11 K/uL  Hemoglobin : 9.7 g/dL  Hematocrit : 32.1 %  Platelet Count - Automated : 321 K/uL  Mean Cell Volume : 86.3 fL  Mean Cell Hemoglobin : 26.1 pg  Mean Cell Hemoglobin Concentration : 30.2 gm/dL  Auto Neutrophil # : 12.27 K/uL  Auto Lymphocyte # : 1.87 K/uL  Auto Monocyte # : 1.25 K/uL  Auto Eosinophil # : 0.38 K/uL  Auto Basophil # : 0.19 K/uL  Auto Neutrophil % : 76.1 %  Auto Lymphocyte % : 11.6 %  Auto Monocyte % : 7.8 %  Auto Eosinophil % : 2.4 %  Auto Basophil % : 1.2 %    10-19    143  |  109<H>  |  34<H>  ----------------------------<  98  4.5   |  22  |  0.92    Ca    9.8      19 Oct 2022 10:38    TPro  6.1  /  Alb  3.3  /  TBili  0.8  /  DBili  x   /  AST  41<H>  /  ALT  23  /  AlkPhos  88  10-19    TSH: Thyroid Stimulating Hormone, Serum: 4.91 uIU/mL (10-19 @ 10:38)    
GENERAL SURGERY CONSULT NOTE  Consulting surgical team: B team surgery  Consulting attending: Dr. Mead     HPI:  HPI: 87F with hx of Afib on Eliquis (last dose yesterday), bradycardia, HLD, hypothyroid presents from [facility] with LLE swelling. Patient reports that 2 days ago when she was working with new PT machine, she thinks she might have injured her leg. Overall patient is unclear of what happened. Brought in from [facility] this morning due to bleeding from LLE. Denies motor/sensory deficits.     In the ED, patient bradycardic to 30s-40s (per [facility] that is her baseline), /59, afebrile. Labs show H/H 9.7/32.1.        PAST MEDICAL HISTORY:      PAST SURGICAL HISTORY:      SOCIAL HISTORY:  - Denies EtOH abuse, smoking, IVDA    MEDICATIONS:  calcium gluconate IVPB 2 Gram(s) IV Intermittent Once  lactated ringers Bolus 500 milliLiter(s) IV Bolus once      ALLERGIES:  penicillin (Unknown)      VITALS & I/Os:  Vital Signs Last 24 Hrs  T(C): --  T(F): --  HR: 35 (19 Oct 2022 11:00) (35 - 42)  BP: 113/62 (19 Oct 2022 11:00) (100/59 - 113/62)  BP(mean): --  RR: 16 (19 Oct 2022 11:00) (16 - 16)  SpO2: 100% (19 Oct 2022 11:00) (98% - 100%)    Parameters below as of 19 Oct 2022 11:00  Patient On (Oxygen Delivery Method): nasal cannula  O2 Flow (L/min): 2       I&O's Summary      PHYSICAL EXAM:  GEN: resting comfortably in bed, in NAD  RESP: no acute respiratory distress, breathing comfortably   ABD: soft, non-distended, non-tender   EXT:      - LLE: open wound on L lateral with large skin tea and blood clot visible. Dopplerable PT/DP pulses. No motor/sensory deficit     - RLE: DP/PT signals. No motor/sensory deficits.     NEURO:  no focal neuro deficits     LABS:                        9.7    16.11 )-----------( 321      ( 19 Oct 2022 10:38 )             32.1     10-19    143  |  109<H>  |  34<H>  ----------------------------<  98  4.5   |  22  |  0.92    Ca    9.8      19 Oct 2022 10:38    TPro  6.1  /  Alb  3.3  /  TBili  0.8  /  DBili  x   /  AST  41<H>  /  ALT  23  /  AlkPhos  88  10-19    Lactate:  10-19 @ 10:38  2.4    PT/INR - ( 19 Oct 2022 10:38 )   PT: 19.0 sec;   INR: 1.63 ratio         PTT - ( 19 Oct 2022 10:38 )  PTT:31.1 sec              IMAGING:  
Wound SURGERY CONSULT NOTE    HPI:  87F PMHx afib (eliquis), HLD, hypothyroid, bradycardia, leg + sacral ulcers presents from [facility] after c/o LLE tightness and pain after a PT 2 days ago.  Since then has noted tightness and pain. Today, she reports noticing that her LLE had become swollen and painful, alongside pain and stiffness in the L foot then skin suddenly ruptured and began to bleed quickly, and was transported to the ED. LLE was found to have ruptured and bled.  Was seen by surgery and underwent debridement with 300-400cc clot evacuated.  Noted on telemetry and EKG that patient was bradycardic HR 30-40's.  Denies any chest pain/SOB/Palp/Lightheadedness/Syncope. Denies additional bleeding, BRBPR, hematuria, hematochezia. Denies f/c/dysuria      Pt was ambulatory and independent in ADLS (walked with walker) until recent admission to [facility] for ulcers. Since that admission has been at rehab in [facility]. No B/B incontininece but uses bedpan recently as limited by jah    PMHx: afib (eliquis), HLD, hypothyroid, bradycardia, leg + sacral ulcer, opthalmic conditions  PSHx: Tonsillectomy appendectomy, hysterectomy  Allergies: PCN (rash, as adult) (19 Oct 2022 18:25)    Wound consult requested to assist w/ management of left lower leg pre-tibial hematoma s/p debridement by general surgery and sacral stage 4 pressure injury. Patient c/o pain to LLE, slowly improving since admission. Per patient she was previously ambulating with walker but has been increasingly sedentary. She is unable to recall how hematoma acquired, does not remember any trauma or having fell. Patient reports that she has had "a slow heart rate for years" and does not want any cardiac interventions. Prior to rehab patient reports that she was living in a private residence with her daughter and grandchildren. Reports that she has a wound to her backside, denies pain and/or tenderness. Per records that were sent with patient, she had a sacral unstageable pressure injury that was treated with Zinc oxide and left trochanter unstageable pressure injury. Endorses that at times she is functionally incontinent of urine or stool, prefers to wear diapers or pull-ups. Denies n/v, fever, chills, diarrhea. Denies SOB and/or chest pain.    Current Diet: Diet, Regular (10-20-22 @ 16:49)      PAST MEDICAL & SURGICAL HISTORY:  Afib  Bradycardia  Hypothyroid  History of appendectomy  History of tonsillectomy  History of hysterectomy      REVIEW OF SYSTEMS: General, skin see above  all other systems negative.    MEDICATIONS  (STANDING):  ALPRAZolam 0.25 milliGRAM(s) Oral two times a day  atorvastatin 80 milliGRAM(s) Oral at bedtime  brimonidine 0.2% Ophthalmic Solution 1 Drop(s) Both EYES three times a day  dorzolamide 2% Ophthalmic Solution 1 Drop(s) Both EYES three times a day  gabapentin 100 milliGRAM(s) Oral three times a day  ketorolac 0.5% Ophthalmic Solution 1 Drop(s) Both EYES at bedtime  levothyroxine 50 MICROGram(s) Oral daily  melatonin 3 milliGRAM(s) Oral at bedtime  multivitamin 1 Tablet(s) Oral daily  predniSONE   Tablet 5 milliGRAM(s) Oral daily  senna 2 Tablet(s) Oral at bedtime    MEDICATIONS  (PRN):  acetaminophen     Tablet .. 650 milliGRAM(s) Oral every 6 hours PRN Temp greater or equal to 38C (100.4F), Mild Pain (1 - 3), Moderate Pain (4 - 6)      Allergies    penicillin (Unknown)    Intolerances        SOCIAL HISTORY:  Admitted from [facility]. No tobacco, etoh, or illicit drug use.    FAMILY HISTORY:      PHYSICAL EXAM:  Vital Signs Last 24 Hrs  T(C): 36.7 (20 Oct 2022 13:30), Max: 36.7 (20 Oct 2022 13:30)  T(F): 98 (20 Oct 2022 13:30), Max: 98 (20 Oct 2022 13:30)  HR: 48 (20 Oct 2022 13:30) (28 - 48)  BP: 116/55 (20 Oct 2022 13:30) (101/36 - 136/51)  BP(mean): 78 (20 Oct 2022 01:33) (55 - 80)  RR: 17 (20 Oct 2022 13:30) (13 - 20)  SpO2: 97% (20 Oct 2022 13:30) (95% - 100%)    Parameters below as of 20 Oct 2022 13:30  Patient On (Oxygen Delivery Method): room air    Wt: 86.7kg (20-Oct-2022)    Constitutional: NAD, A&O x 4, poor historian at times, frail.  (+) low airloss support surface, (+) fluidized positioning devices, single breathable incontinence pad in place, heels offloaded   HEENT:  NC/AT, PERRL, mucosa moist  Cardiovascular: Bradycardic, zoll pads in place  Respiratory: nonlabored, equal chest expansion, room air  Gastrointestinal: soft NT/ND, incontinent of pasty stool, perineal care provided.  : external female urinary collection device in place  Neurology: strength & sensation grossly intact  Musculoskeletal: limited ROM b/l le, requires 2 person assist for t&P, no contractures or deformities.  Vascular: Dry -flaky skin of b/l le. Evidence of healed ulceration to right medial lower leg.  BLE equally warm, Left slightly warmer than right. +2 dp pulse bilaterally, capillary refill < 3 seconds bilaterally.  Skin: frail, scattered ecchymosis without hematoma to bilateral upper extremities  Left anterior lower leg hematoma s/p debridement- removed dressing with moderate dry sanguinous dressing-  20cmx5.5cmx0.3cm, undermining beneath epidermal-dermal skin flaps at 3 and 9 o'clock extending 4.5cm and 3cm respectively. Wound base 100% red-moist viable. No active bleeding noted, hemostasis remains. Skin flaps with maceration, dusky. Periwound skin with ecchymosis. Mild increased warmth. Cleanse with NS, liquid barrier film applied to periwound skin and skin flaps, xeroform to wound base, covered with abdominal pads, kerlix and ace wrap.  Sacrum- stage 4 pressure injury - patient turned to right side- [id number]- pink-moist agranular base. Small serous drainage, no odor. Periwound skin intact. No associated cellulitis. Aquacel/foam applied.  Left trochanter- mild soft tissue defect/scar tissue, no open ulceration noted, evidence of healed skin impairment.   Psych: calm, cooperative, tearful at times when asking if she will needed surgery     LABS/ CULTURES/ RADIOLOGY:                        9.3    13.21 )-----------( 251      ( 20 Oct 2022 05:58 )             29.1       140  |  107  |  32  ----------------------------<  88      [10-20-22 @ 05:58]  4.1   |  22  |  0.91        Ca     9.8     [10-20-22 @ 05:58]      Mg     1.90     [10-20-22 @ 05:58]      Phos  4.0     [10-20-22 @ 05:58]    TPro  5.7  /  Alb  3.1  /  TBili  1.2  /  DBili  x   /  AST  23  /  ALT  14  /  AlkPhos  84  [10-20-22 @ 05:58]    PT/INR: PT 19.0 , INR 1.63       [10-19-22 @ 10:38]  PTT: 31.1       [10-19-22 @ 10:38]      Culture - Blood (collected 10-19-22 @ 12:42)  Source: .Blood Blood  Preliminary Report (10-20-22 @ 15:02):    No growth to date.    Culture - Blood (collected 10-19-22 @ 12:20)  Source: .Blood Blood  Preliminary Report (10-20-22 @ 15:02):    No growth to date.      < from: CT Angio Lower Extremity w/ IV Cont, Bilateral (10.19.22 @ 13:06) >  ACC: [id number] EXAM:  CT ANGIO LWR EXT (W)AW IC BI                          *** ADDENDUM***    Findings and impression section should also include: Decubitus ulcer   overlying the distal sacrum/coccyx. No obvious bony erosion, however MRI   is more sensitive for the evaluation of osteomyelitis.    --- End of Report ---    *** END OF ADDENDUM***      PROCEDURE DATE:  10/19/2022          INTERPRETATION:  CLINICAL INFORMATION: Soft tissue mass, lower leg,   spontaneous hemorrhage.    COMPARISON: None.    CONTRAST/COMPLICATIONS:  IV Contrast: Omnipaque 350  115 cc administered   5 cc discarded  Oral Contrast: NONE  Complications: None reported at time of study completion    CT ANGIOGRAM ABDOMEN, PELVIS, AND LOWER EXTREMITIES:    PROCEDURE:  Initially, nonenhanced CT was obtained through the calves. Then,   following the rapid administration of intravenous contrast, CT   angiography was performed through the pelvis, and lower extremities down   to the toes.  Sagittal and coronal reformats as well as 3D   reconstructions were performed.    FINDINGS:    CENTRAL ARTERIAL SYSTEM:    Atherosclerotic changes. Visualized distal abdominal aorta is patent.   Common iliac and external iliac arteries are patent. Mild stenosis of the   bilateral internal iliac arteries.    RIGHT LOWER EXTREMITY:    Atherosclerotic changes. Right common femoral artery is patent. Mild   stenosis of the proximal superficial femoral artery. Poor opacification   of the distal superficial femoral artery. Popliteal and infrapopliteal   arteries are not opacified.    LEFT LOWER EXTREMITY:    Atherosclerotic changes. Left common femoral artery is patent.   Superficial femoral and deep femoral arteries are patent. Mild stenosis   of the popliteal artery. Anterior tibial artery, posterior tibial artery,   and peroneal artery are patent proximally and are poorly opacified   distally.    ADDITIONAL FINDINGS: Colonic diverticulosis. Bilateral lower extremity   skin thickening and subcutaneous edema. Large soft tissue defect in the   left anterior calf.    IMPRESSION:  Technically limited examination due to poor opacification of the arteries   in the distal lower extremities as above, which may be due to poor   cardiac output. Delayed phase imaging was not performed. Occlusion cannot   be excluded.    Bilateral lower extremity skin thickening and subcutaneous edema. Large   soft tissue defect in the left anterior calf.        --- End of Report ---    ***Please see the addendum at the top of this report. It may contain   additional important information or changes.****        HAWK GALLARDO MD; Attending Radiologist  This document has been electronically signed. Oct 19 2022  2:17PM  Addend:HAWK GALLARDO MD; Attending Radiologist  This addendum was electronically signed on: Oct 19 2022  3:08PM.    < end of copied text >

## 2022-10-20 NOTE — PROGRESS NOTE ADULT - SUBJECTIVE AND OBJECTIVE BOX
PROGRESS NOTE:       Patient is a 87y old  Female who presents with a chief complaint of Hematoma (20 Oct 2022 10:53)      SUBJECTIVE / OVERNIGHT EVENTS: NAEON.    ADDITIONAL REVIEW OF SYSTEMS: Denies cp/sob/n/v/abd pain      PHYSICAL EXAM:  Vital Signs Last 24 Hrs  T(C): 36.7 (20 Oct 2022 13:30), Max: 36.7 (20 Oct 2022 13:30)  T(F): 98 (20 Oct 2022 13:30), Max: 98 (20 Oct 2022 13:30)  HR: 48 (20 Oct 2022 13:30) (28 - 48)  BP: 116/55 (20 Oct 2022 13:30) (101/36 - 145/56)  BP(mean): 78 (20 Oct 2022 01:33) (55 - 80)  RR: 17 (20 Oct 2022 13:30) (13 - 20)  SpO2: 97% (20 Oct 2022 13:30) (95% - 100%)    Parameters below as of 20 Oct 2022 13:30  Patient On (Oxygen Delivery Method): room air      GENERAL: No acute distress, well-developed  HEAD:  Atraumatic, Normocephalic  EYES: EOM grossly intact,  conjunctiva and sclera clear, PERRL  CHEST/LUNG: CTAB; No wheezes, rales, or rhonchi  HEART: Regular rate and rhythm; No murmurs, rubs, or gallops  ABDOMEN: Soft, non-tender, non-distended; normal bowel sounds, no organomegaly  EXTREMITIES: LLE bandaged  NEUROLOGY: A&O x 3, no focal deficits  SKIN: No rashes or lesions      MEDICATIONS  (STANDING):  ALPRAZolam 0.25 milliGRAM(s) Oral two times a day  atorvastatin 80 milliGRAM(s) Oral at bedtime  brimonidine 0.2% Ophthalmic Solution 1 Drop(s) Both EYES three times a day  dorzolamide 2% Ophthalmic Solution 1 Drop(s) Both EYES three times a day  gabapentin 100 milliGRAM(s) Oral three times a day  ketorolac 0.5% Ophthalmic Solution 1 Drop(s) Both EYES at bedtime  levothyroxine 50 MICROGram(s) Oral daily  melatonin 3 milliGRAM(s) Oral at bedtime  multivitamin 1 Tablet(s) Oral daily  senna 2 Tablet(s) Oral at bedtime    MEDICATIONS  (PRN):  acetaminophen     Tablet .. 650 milliGRAM(s) Oral every 6 hours PRN Temp greater or equal to 38C (100.4F), Mild Pain (1 - 3), Moderate Pain (4 - 6)        I&O's Summary        LABS:  CAPILLARY BLOOD GLUCOSE      POCT Blood Glucose.: 102 mg/dL (20 Oct 2022 12:19)                          9.3    13.21 )-----------( 251      ( 20 Oct 2022 05:58 )             29.1     10-20    140  |  107  |  32<H>  ----------------------------<  88  4.1   |  22  |  0.91    Ca    9.8      20 Oct 2022 05:58  Phos  4.0     10-20  Mg     1.90     10-20    TPro  5.7<L>  /  Alb  3.1<L>  /  TBili  1.2  /  DBili  x   /  AST  23  /  ALT  14  /  AlkPhos  84  10-20    PT/INR - ( 19 Oct 2022 10:38 )   PT: 19.0 sec;   INR: 1.63 ratio         PTT - ( 19 Oct 2022 10:38 )  PTT:31.1 sec                RADIOLOGY & ADDITIONAL TESTS:      COORDINATION OF CARE:

## 2022-10-20 NOTE — CONSULT NOTE ADULT - CONSULT REASON
bradycardia
LLE hematoma
Left anterior lower leg hematoma s/p bedside debridement by General surgery requesting topical wound care recommendations.  Sacrum stage 4 pressure injury present on arrival

## 2022-10-20 NOTE — PROGRESS NOTE ADULT - ASSESSMENT
87F PMHx afib (eliquis), HLD, hypothyroid, bradycardia, leg + sacral ulcers presents from [place] with open bleeding wound.    Impression: Bleeding wound on Eliquis iso hx of leg ulcers and recent PT work

## 2022-10-20 NOTE — CONSULT NOTE ADULT - ASSESSMENT
87F with hx of Afib on Eliquis (last dose yesterday), bradycardia, HLD, hypothyroid presents with LLE hematoma. Low concern for compartment syndrome based on physical exam.     Plan:  - Will preform bedside debridement  - Recommend holding eliquis in setting of LLE hematoma  - Recommend admission to medicine   - Recommend wound care consult  - Recommend plastics consult for eventual closure of wound       Plan discussed with attending Dr. Boston Rodriguez PGY-2   B team surgery   [id number] 
Assessment/Plan: 87F PMHx afib (eliquis), HLD, hypothyroid, bradycardia, leg + sacral ulcers presents from [place] with open bleeding wound s/p bedside debridement by general surgery of hematoma. S/p 1unit PRBC.    Wound Consult requested to assist w/ management of LLE open wound and sacral stage 4 pressure injury.    Left anterior lower leg open wound s/p debridement of hematoma:  - Wound base clean, rest moist viable, no bone exposed.  - No active bleeding; hemostasis remained.  - epidermal-dermal flaps at 3 and 9 o'clock macerated and dusky; no necrosis noted at this time; observe for viability.  - Periwound skin with dry-flaky skin. (+) ecchymosis from 10-12 o'clock.   - CT angio lower extremities reviewed; differ findings to primary team, may consider obtaining litzy/pvr, doppler studies.  - Topical Recommendations: gently cleanse with NS, Pat dry. Apply Liquid barrier film to periwound skin. Apply xeroform to wound base, cover with 4x4 gauze and abdominal pads. Once dressing is in place apply sween 24 moisturizer to intact skin of leg and foot, avoid between toes. Secure with kerlix wrap and ace bandage. Change daily.  - Right leg apply sween 24 daily  - Continue to offload heels with complete cair boots.  - Agree with plastic surgery consult for closure.    Sacral stage 4 pressure injury  - wound base clean, no bone exposed.  - small serous drainage.  - no associated cellulitis  - CT no obvious bony erosions, consider MRI to r/o osteomyelitis if wound is not improving. No clinical signs of osteomyelitis at this times,  - Topical recommendations: cleanse with NS. Pat dry. Apply Liquid barrier film to periwound skin. Apply Aquacel hydrofiber to wound base, cover with silicone foam with border. Change daily and prn with episodes of incontinence.  -Continue w/ low air loss fluidized bed surface   - Continue turning and positioning w/ offloading assistive devices as per protocol  - Continue w/ [employer] as per protocol, continue use of single breathable incontinence pad. Educated patient on avoiding use of diapers in setting of full thickness pressure injury. Emotional support provided to patient; reviewed purpose of external female urinary collection device and use of single breathable incontinence pad. Patient verbalized understanding and agreed to avoid diaper usage for now.  - Waffle Cushion to chair when oob to chair      Nutrition Consult for optimization as tolerated consider MVI & Vit C to promote wound healing encourage high quality protein when appropriate.    Upon discharge f/u as outpatient at [facility] [address] [place] [address] [phone]  Seen w/ Dr. Colindres.  Findings and plan discussed with patient, general surgery at [facility], and primary team.  All questions and concerns addressed to meet patient's satisfaction.    Will continue to follow periodically while inpatient, please reconsult earlier if needed.    Thank you for this consult  DAWIT Navarrete, CWADRIELN (pager #[phone])    If after 4PM or before 7:30AM on Mon-Friday or weekend/holiday please contact general surgery for urgent matters.   Team A- [id number]   Team B- [id number]  For non-urgent matters e-mail [email or web address]    We spent 70 minutes face to face with this patient of which more than 50% of the time was spent counseling & coordinating care of this pt
87yoF w/ PMHx afib on eliquis, HLD, hypothyroid presents from NH after c/o LLE tightness and pain after a PT session.  LLE was found to have ruptured and bled.  Was seen by surgery and underwent debridement with 300-400cc clot evacuated.  Noted on telemetry and EKG that patient was bradycardic HR 30-40's.  As per patient, was told a long time ago (does not remember when) that her heart rate was slow.  Denies any chest pain, palpitations, lightheadedness, dizziness, SOB and syncope or near syncope.      Bradycardia   -EKG: Slow Afib VR 40's   -Continue to monitor on telemetry  -Patient remains asymptomatic and hemodynamically stable   -Avoid AV damien blockers

## 2022-10-20 NOTE — PROGRESS NOTE ADULT - SUBJECTIVE AND OBJECTIVE BOX
Interval history:  no acute overnight event  Tele reveals slow Afib at 40s      PAST MEDICAL & SURGICAL HISTORY:  Afib    Bradycardia    Hypothyroid    History of appendectomy    History of tonsillectomy    History of hysterectomy        MEDICATIONS  (STANDING):  ALPRAZolam 0.25 milliGRAM(s) Oral two times a day  atorvastatin 80 milliGRAM(s) Oral at bedtime  brimonidine 0.2% Ophthalmic Solution 1 Drop(s) Both EYES three times a day  dorzolamide 2% Ophthalmic Solution 1 Drop(s) Both EYES three times a day  gabapentin 100 milliGRAM(s) Oral three times a day  ketorolac 0.5% Ophthalmic Solution 1 Drop(s) Both EYES at bedtime  levothyroxine 50 MICROGram(s) Oral daily  melatonin 3 milliGRAM(s) Oral at bedtime  multivitamin 1 Tablet(s) Oral daily  senna 2 Tablet(s) Oral at bedtime    MEDICATIONS  (PRN):  acetaminophen     Tablet .. 650 milliGRAM(s) Oral every 6 hours PRN Temp greater or equal to 38C (100.4F), Mild Pain (1 - 3), Moderate Pain (4 - 6)            Vital Signs Last 24 Hrs  T(C): 36.3 (20 Oct 2022 07:15), Max: 36.4 (19 Oct 2022 13:15)  T(F): 97.4 (20 Oct 2022 07:15), Max: 97.5 (19 Oct 2022 13:15)  HR: 46 (20 Oct 2022 07:15) (28 - 46)  BP: 124/48 (20 Oct 2022 07:15) (72/53 - 145/56)  BP(mean): 78 (20 Oct 2022 01:33) (55 - 80)  RR: 18 (20 Oct 2022 07:15) (13 - 20)  SpO2: 98% (20 Oct 2022 07:15) (95% - 100%)    Parameters below as of 20 Oct 2022 07:15  Patient On (Oxygen Delivery Method): room air                INTERPRETATION OF TELEMETRY: Afib at high 30s to 40s    ECG:        LABS:                        9.3    13.21 )-----------( 251      ( 20 Oct 2022 05:58 )             29.1     10-20    140  |  107  |  32<H>  ----------------------------<  88  4.1   |  22  |  0.91    Ca    9.8      20 Oct 2022 05:58  Phos  4.0     10-20  Mg     1.90     10-20    TPro  5.7<L>  /  Alb  3.1<L>  /  TBili  1.2  /  DBili  x   /  AST  23  /  ALT  14  /  AlkPhos  84  10-20        PT/INR - ( 19 Oct 2022 10:38 )   PT: 19.0 sec;   INR: 1.63 ratio         PTT - ( 19 Oct 2022 10:38 )  PTT:31.1 sec    I&O's Summary    BNP  RADIOLOGY & ADDITIONAL STUDIES:      PHYSICAL EXAM:    GENERAL: In no apparent distress, well nourished, and hydrated.  HEART: Irregular rate and rhythm; No murmurs, rubs, or gallops.  PULMONARY: Clear to auscultation and percussion.  No rales, wheezing, or rhonchi bilaterally.  ABDOMEN: Soft, Nontender, Nondistended; Bowel sounds present  EXTREMITIES:  Peripheral Pulses presents, LLE dressing in place intact  NEUROLOGICAL: Grossly nonfocal

## 2022-10-20 NOTE — PROGRESS NOTE ADULT - ATTENDING COMMENTS
LLE hematoma over tibia, s/p beside debridement 10/19, low concern for compartment syndrome as per surgery, wound care and plastics consult, pain control   Afib w/ SVR, holding Eliquis d/t hematoma, monitor HR LLE hematoma over tibia, s/p beside debridement 10/19, low concern for compartment syndrome as per surgery, wound care and plastics consult, monitor hgb, holding anticoagulation, pain control   Afib w/ SVR, holding Eliquis d/t hematoma, monitor HR

## 2022-10-20 NOTE — PROGRESS NOTE ADULT - ASSESSMENT
87F with hx of Afib on Eliquis (last dose yesterday), bradycardia, HLD, hypothyroid presents with LLE hematoma over tibia. Low concern for compartment syndrome based on physical exam. Now s/p 10/19 beside debridement.    RECOMMENDATIONS  - Holding eliquis in setting of LLE hematoma  - Wound care consult  - Plastics consult for wound closure    B team surgery   [id number]

## 2022-10-20 NOTE — PROGRESS NOTE ADULT - ASSESSMENT
87yoF w/ PMHx afib on eliquis, HLD, hypothyroid presents from NH after c/o LLE tightness and pain after a PT session.  LLE was found to have ruptured and bled.  Was seen by surgery and underwent debridement with 300-400cc clot evacuated.  Noted on telemetry and EKG that patient was bradycardic HR 30-40's.  As per patient, was told a long time ago (does not remember when) that her heart rate was slow.  Denies any chest pain, palpitations, lightheadedness, dizziness, SOB and syncope or near syncope.      Bradycardia   - EKG: Slow Afib VR 40's   - TSH 4.91, recommend endocrinology follow   -Continue to monitor on telemetry  -Patient remains asymptomatic and hemodynamically stable   -Avoid AV damien blockers   -Recommend AC Eliquis if clear by surgery team (Eliquis is on hold in setting of LLE hematoma)  - Continue care as per primary team / surgery

## 2022-10-21 ENCOUNTER — TRANSCRIPTION ENCOUNTER (OUTPATIENT)
Age: 87
End: 2022-10-21

## 2022-10-21 LAB
ANION GAP SERPL CALC-SCNC: 10 MMOL/L — SIGNIFICANT CHANGE UP (ref 7–14)
BASOPHILS # BLD AUTO: 0.1 K/UL — SIGNIFICANT CHANGE UP (ref 0–0.2)
BASOPHILS NFR BLD AUTO: 0.9 % — SIGNIFICANT CHANGE UP (ref 0–2)
BUN SERPL-MCNC: 35 MG/DL — HIGH (ref 7–23)
CALCIUM SERPL-MCNC: 9.1 MG/DL — SIGNIFICANT CHANGE UP (ref 8.4–10.5)
CHLORIDE SERPL-SCNC: 111 MMOL/L — HIGH (ref 98–107)
CO2 SERPL-SCNC: 21 MMOL/L — LOW (ref 22–31)
CREAT SERPL-MCNC: 1.22 MG/DL — SIGNIFICANT CHANGE UP (ref 0.5–1.3)
EGFR: 43 ML/MIN/1.73M2 — LOW
EOSINOPHIL # BLD AUTO: 0.08 K/UL — SIGNIFICANT CHANGE UP (ref 0–0.5)
EOSINOPHIL NFR BLD AUTO: 0.8 % — SIGNIFICANT CHANGE UP (ref 0–6)
GLUCOSE SERPL-MCNC: 123 MG/DL — HIGH (ref 70–99)
HCT VFR BLD CALC: 23 % — LOW (ref 34.5–45)
HCT VFR BLD CALC: 24.1 % — LOW (ref 34.5–45)
HGB BLD-MCNC: 7.2 G/DL — LOW (ref 11.5–15.5)
HGB BLD-MCNC: 7.7 G/DL — LOW (ref 11.5–15.5)
IANC: 7.74 K/UL — HIGH (ref 1.8–7.4)
IMM GRANULOCYTES NFR BLD AUTO: 0.5 % — SIGNIFICANT CHANGE UP (ref 0–0.9)
LYMPHOCYTES # BLD AUTO: 1.29 K/UL — SIGNIFICANT CHANGE UP (ref 1–3.3)
LYMPHOCYTES # BLD AUTO: 12.2 % — LOW (ref 13–44)
MAGNESIUM SERPL-MCNC: 1.9 MG/DL — SIGNIFICANT CHANGE UP (ref 1.6–2.6)
MCHC RBC-ENTMCNC: 26.8 PG — LOW (ref 27–34)
MCHC RBC-ENTMCNC: 27.2 PG — SIGNIFICANT CHANGE UP (ref 27–34)
MCHC RBC-ENTMCNC: 31.3 GM/DL — LOW (ref 32–36)
MCHC RBC-ENTMCNC: 32 GM/DL — SIGNIFICANT CHANGE UP (ref 32–36)
MCV RBC AUTO: 85.2 FL — SIGNIFICANT CHANGE UP (ref 80–100)
MCV RBC AUTO: 85.5 FL — SIGNIFICANT CHANGE UP (ref 80–100)
MONOCYTES # BLD AUTO: 1.29 K/UL — HIGH (ref 0–0.9)
MONOCYTES NFR BLD AUTO: 12.2 % — SIGNIFICANT CHANGE UP (ref 2–14)
NEUTROPHILS # BLD AUTO: 7.74 K/UL — HIGH (ref 1.8–7.4)
NEUTROPHILS NFR BLD AUTO: 73.4 % — SIGNIFICANT CHANGE UP (ref 43–77)
NRBC # BLD: 0 /100 WBCS — SIGNIFICANT CHANGE UP (ref 0–0)
NRBC # BLD: 0 /100 WBCS — SIGNIFICANT CHANGE UP (ref 0–0)
NRBC # FLD: 0 K/UL — SIGNIFICANT CHANGE UP (ref 0–0)
NRBC # FLD: 0 K/UL — SIGNIFICANT CHANGE UP (ref 0–0)
PHOSPHATE SERPL-MCNC: 4 MG/DL — SIGNIFICANT CHANGE UP (ref 2.5–4.5)
PLATELET # BLD AUTO: 209 K/UL — SIGNIFICANT CHANGE UP (ref 150–400)
PLATELET # BLD AUTO: 210 K/UL — SIGNIFICANT CHANGE UP (ref 150–400)
POTASSIUM SERPL-MCNC: 3.8 MMOL/L — SIGNIFICANT CHANGE UP (ref 3.5–5.3)
POTASSIUM SERPL-SCNC: 3.8 MMOL/L — SIGNIFICANT CHANGE UP (ref 3.5–5.3)
RBC # BLD: 2.69 M/UL — LOW (ref 3.8–5.2)
RBC # BLD: 2.83 M/UL — LOW (ref 3.8–5.2)
RBC # FLD: 16 % — HIGH (ref 10.3–14.5)
RBC # FLD: 16.1 % — HIGH (ref 10.3–14.5)
SODIUM SERPL-SCNC: 142 MMOL/L — SIGNIFICANT CHANGE UP (ref 135–145)
WBC # BLD: 10.55 K/UL — HIGH (ref 3.8–10.5)
WBC # BLD: 11.96 K/UL — HIGH (ref 3.8–10.5)
WBC # FLD AUTO: 10.55 K/UL — HIGH (ref 3.8–10.5)
WBC # FLD AUTO: 11.96 K/UL — HIGH (ref 3.8–10.5)

## 2022-10-21 PROCEDURE — 99233 SBSQ HOSP IP/OBS HIGH 50: CPT | Mod: GC

## 2022-10-21 PROCEDURE — 99232 SBSQ HOSP IP/OBS MODERATE 35: CPT

## 2022-10-21 RX ADMIN — BRIMONIDINE TARTRATE 1 DROP(S): 2 SOLUTION/ DROPS OPHTHALMIC at 21:35

## 2022-10-21 RX ADMIN — Medication 0.25 MILLIGRAM(S): at 18:17

## 2022-10-21 RX ADMIN — GABAPENTIN 100 MILLIGRAM(S): 400 CAPSULE ORAL at 13:57

## 2022-10-21 RX ADMIN — Medication 5 MILLIGRAM(S): at 05:41

## 2022-10-21 RX ADMIN — SENNA PLUS 2 TABLET(S): 8.6 TABLET ORAL at 21:36

## 2022-10-21 RX ADMIN — Medication 3 MILLIGRAM(S): at 21:35

## 2022-10-21 RX ADMIN — GABAPENTIN 100 MILLIGRAM(S): 400 CAPSULE ORAL at 21:35

## 2022-10-21 RX ADMIN — ATORVASTATIN CALCIUM 80 MILLIGRAM(S): 80 TABLET, FILM COATED ORAL at 21:36

## 2022-10-21 RX ADMIN — GABAPENTIN 100 MILLIGRAM(S): 400 CAPSULE ORAL at 05:41

## 2022-10-21 RX ADMIN — DORZOLAMIDE HYDROCHLORIDE 1 DROP(S): 20 SOLUTION/ DROPS OPHTHALMIC at 05:42

## 2022-10-21 RX ADMIN — DORZOLAMIDE HYDROCHLORIDE 1 DROP(S): 20 SOLUTION/ DROPS OPHTHALMIC at 13:58

## 2022-10-21 RX ADMIN — BRIMONIDINE TARTRATE 1 DROP(S): 2 SOLUTION/ DROPS OPHTHALMIC at 13:57

## 2022-10-21 RX ADMIN — Medication 50 MICROGRAM(S): at 05:42

## 2022-10-21 RX ADMIN — Medication 1 TABLET(S): at 12:52

## 2022-10-21 RX ADMIN — Medication 1 DROP(S): at 21:35

## 2022-10-21 RX ADMIN — Medication 0.25 MILLIGRAM(S): at 05:41

## 2022-10-21 RX ADMIN — BRIMONIDINE TARTRATE 1 DROP(S): 2 SOLUTION/ DROPS OPHTHALMIC at 05:42

## 2022-10-21 RX ADMIN — DORZOLAMIDE HYDROCHLORIDE 1 DROP(S): 20 SOLUTION/ DROPS OPHTHALMIC at 21:35

## 2022-10-21 NOTE — PROGRESS NOTE ADULT - ASSESSMENT
87yoF w/ PMHx afib on eliquis, HLD, hypothyroid presents from NH after c/o LLE tightness and pain after a PT session.  LLE was found to have ruptured and bled.  Was seen by surgery and underwent debridement with 300-400cc clot evacuated.  Noted on telemetry and EKG that patient was bradycardic HR 30-40's.  As per patient, was told a long time ago (does not remember when) that her heart rate was slow.  Denies any chest pain, palpitations, lightheadedness, dizziness, SOB and syncope or near syncope.      Bradycardia   - EKG: Slow Afib VR 40's   - TSH 4.91, recommend endocrinology follow   -Continue to monitor on telemetry  -Patient remains asymptomatic and hemodynamically stable   -Avoid AV damien blockers   -Recommend AC Eliquis if clear by surgery team (Eliquis is on hold in setting of LLE hematoma)  - Continue care as per primary team / surgery 87yoF w/ PMHx afib on eliquis, HLD, hypothyroid presents from NH after c/o LLE tightness and pain after a PT session.  LLE was found to have ruptured and bled.  Was seen by surgery and underwent debridement with 300-400cc clot evacuated.  Noted on telemetry and EKG that patient was bradycardic HR 30-40's.  As per patient, was told a long time ago (does not remember when) that her heart rate was slow.  Denies any chest pain, palpitations, lightheadedness, dizziness, SOB and syncope or near syncope.      Bradycardia   - EKG: Slow Afib VR 40's with one episode of 8 beats aberrancy conduction  - TSH 4.91, recommend endocrinology follow   -Continue to monitor on telemetry  -Patient remains asymptomatic and hemodynamically stable   -Avoid AV damien blockers   -Recommend AC Eliquis if clear by surgery team (Eliquis is on hold in setting of LLE hematoma)  - Continue care as per primary team / surgery

## 2022-10-21 NOTE — DISCHARGE NOTE PROVIDER - HOSPITAL COURSE
87F PMHx afib (eliquis), HLD, hypothyroid, bradycardia, leg + sacral ulcers presents from [place] with open bleeding wound.    Impression: Bleeding wound on Eliquis iso hx of leg ulcers and recent PT work 87F PMHx afib (eliquis), HLD, hypothyroid, bradycardia, leg + sacral ulcers presents from [place] after c/o LLE tightness and pain after a PT 2 days ago.  Since then has noted tightness and pain evolving to swelling, jah, stiffness, and eventuial bleeding on day of admission. LLE was found to have ruptured and bled.  Was seen by surgery and underwent debridement with 300-400cc clot evacuated.  Noted on telemetry and EKG that patient was bradycardic HR 30-40's without symptoms    Pt was seen by EP who did not feel her bradycardia warranted intervention. Pt was followwed by surgery and wound care for her wound. Plastics was consulted who recommended outpatient follow up for closure  after allowing time for wound to mature.      Pt medically clear for discharge to [place] with continued wound care. 87F PMHx afib (eliquis), HLD, hypothyroid, bradycardia, leg + sacral ulcers presents from [place] after c/o LLE tightness and pain after a PT 2 days ago.  Since then has noted tightness and pain evolving to swelling, jah, stiffness, and eventuial bleeding on day of admission. LLE was found to have ruptured and bled.  Was seen by surgery and underwent debridement with 300-400cc clot evacuated.  Noted on telemetry and EKG that patient was bradycardic HR 30-40's without symptoms    Pt was seen by EP who did not feel her bradycardia warranted intervention. Pt was followwed by surgery and wound care for her wound. Plastics was consulted who recommended outpatient follow up for closure  after allowing time for wound to mature.      Pt medically clear for discharge to [place] with continued wound care including wound vac, PT as tolerated, and eventual plastics reconsult 87F PMHx afib (eliquis), HLD, hypothyroid, bradycardia, leg + sacral ulcers presents from [place] after c/o LLE tightness and pain after a PT 2 days ago.  Since then has noted tightness and pain evolving to swelling, jah, stiffness, and eventuial bleeding on day of admission. LLE was found to have ruptured and bled.  Was seen by surgery and underwent debridement with 300-400cc clot evacuated.  Noted on telemetry and EKG that patient was bradycardic HR 30-40's without symptoms    Pt was seen by EP who did not feel her bradycardia warranted intervention. Pt was followed by surgery and wound care for her wound. Plastics was consulted who recommended outpatient follow up for closure  after allowing time for wound to mature.      Pt medically clear for discharge to [facility] with continued wound care including wound vac, PT as tolerated, and eventual plastics reconsult

## 2022-10-21 NOTE — PROGRESS NOTE ADULT - PROBLEM SELECTOR PLAN 1
Picture in Surgery Consult note 10/19/22  S/P Debridement with surgery  Wound care consulted  Surgery following  S/P 1 unit blood    Plan  -F/U WC recs  -F/U Surgery recs  -Daily dressing changes  -Will consult plastics for wound closure

## 2022-10-21 NOTE — PROGRESS NOTE ADULT - SUBJECTIVE AND OBJECTIVE BOX
Interval  history:  No acute overnight event  Tele reveals slow afib at 40s, patient denies palpitation, CP, SOB, dizziness.      PAST MEDICAL & SURGICAL HISTORY:  Afib    Bradycardia    Hypothyroid    History of appendectomy    History of tonsillectomy    History of hysterectomy        MEDICATIONS  (STANDING):  ALPRAZolam 0.25 milliGRAM(s) Oral two times a day  atorvastatin 80 milliGRAM(s) Oral at bedtime  brimonidine 0.2% Ophthalmic Solution 1 Drop(s) Both EYES three times a day  dorzolamide 2% Ophthalmic Solution 1 Drop(s) Both EYES three times a day  gabapentin 100 milliGRAM(s) Oral three times a day  ketorolac 0.5% Ophthalmic Solution 1 Drop(s) Both EYES at bedtime  levothyroxine 50 MICROGram(s) Oral daily  melatonin 3 milliGRAM(s) Oral at bedtime  multivitamin 1 Tablet(s) Oral daily  predniSONE   Tablet 5 milliGRAM(s) Oral daily  senna 2 Tablet(s) Oral at bedtime    MEDICATIONS  (PRN):  acetaminophen     Tablet .. 650 milliGRAM(s) Oral every 6 hours PRN Temp greater or equal to 38C (100.4F), Mild Pain (1 - 3), Moderate Pain (4 - 6)            Vital Signs Last 24 Hrs  T(C): 36.8 (21 Oct 2022 05:30), Max: 36.9 (20 Oct 2022 21:46)  T(F): 98.3 (21 Oct 2022 05:30), Max: 98.5 (20 Oct 2022 21:46)  HR: 53 (21 Oct 2022 05:30) (48 - 53)  BP: 125/53 (21 Oct 2022 05:30) (116/55 - 125/53)  BP(mean): --  RR: 17 (21 Oct 2022 05:30) (16 - 17)  SpO2: 98% (21 Oct 2022 05:30) (97% - 98%)    Parameters below as of 21 Oct 2022 05:30  Patient On (Oxygen Delivery Method): room air                INTERPRETATION OF TELEMETRY: slow Afib at high 30s to 40s    ECG:        LABS:                        7.2    10.55 )-----------( 209      ( 21 Oct 2022 07:40 )             23.0     10-21    142  |  111<H>  |  35<H>  ----------------------------<  123<H>  3.8   |  21<L>  |  1.22    Ca    9.1      21 Oct 2022 07:40  Phos  4.0     10-21  Mg     1.90     10-21    TPro  5.7<L>  /  Alb  3.1<L>  /  TBili  1.2  /  DBili  x   /  AST  23  /  ALT  14  /  AlkPhos  84  10-20        PT/INR - ( 19 Oct 2022 10:38 )   PT: 19.0 sec;   INR: 1.63 ratio         PTT - ( 19 Oct 2022 10:38 )  PTT:31.1 sec    I&O's Summary    BNP  RADIOLOGY & ADDITIONAL STUDIES:      PHYSICAL EXAM:    GENERAL: In no apparent distress, well nourished, and hydrated.  HEART: Irregular rate and rhythm; No murmurs, rubs, or gallops.  PULMONARY: Clear to auscultation and percussion.  No rales, wheezing, or rhonchi bilaterally.  ABDOMEN: Soft, Nontender, Nondistended; Bowel sounds present  EXTREMITIES:  Peripheral Pulses presents, LLE dressing in place intact  NEUROLOGICAL: AAOx2         Interval  history:  No acute overnight event  Tele reveals slow afib at 40s, patient denies palpitation, CP, SOB, dizziness.      PAST MEDICAL & SURGICAL HISTORY:  Afib    Bradycardia    Hypothyroid    History of appendectomy    History of tonsillectomy    History of hysterectomy        MEDICATIONS  (STANDING):  ALPRAZolam 0.25 milliGRAM(s) Oral two times a day  atorvastatin 80 milliGRAM(s) Oral at bedtime  brimonidine 0.2% Ophthalmic Solution 1 Drop(s) Both EYES three times a day  dorzolamide 2% Ophthalmic Solution 1 Drop(s) Both EYES three times a day  gabapentin 100 milliGRAM(s) Oral three times a day  ketorolac 0.5% Ophthalmic Solution 1 Drop(s) Both EYES at bedtime  levothyroxine 50 MICROGram(s) Oral daily  melatonin 3 milliGRAM(s) Oral at bedtime  multivitamin 1 Tablet(s) Oral daily  predniSONE   Tablet 5 milliGRAM(s) Oral daily  senna 2 Tablet(s) Oral at bedtime    MEDICATIONS  (PRN):  acetaminophen     Tablet .. 650 milliGRAM(s) Oral every 6 hours PRN Temp greater or equal to 38C (100.4F), Mild Pain (1 - 3), Moderate Pain (4 - 6)            Vital Signs Last 24 Hrs  T(C): 36.8 (21 Oct 2022 05:30), Max: 36.9 (20 Oct 2022 21:46)  T(F): 98.3 (21 Oct 2022 05:30), Max: 98.5 (20 Oct 2022 21:46)  HR: 53 (21 Oct 2022 05:30) (48 - 53)  BP: 125/53 (21 Oct 2022 05:30) (116/55 - 125/53)  BP(mean): --  RR: 17 (21 Oct 2022 05:30) (16 - 17)  SpO2: 98% (21 Oct 2022 05:30) (97% - 98%)    Parameters below as of 21 Oct 2022 05:30  Patient On (Oxygen Delivery Method): room air                INTERPRETATION OF TELEMETRY: slow Afib at high 30s to 40s, one episode of Afib with aberrancy conduction X 8 beats     ECG:        LABS:                        7.2    10.55 )-----------( 209      ( 21 Oct 2022 07:40 )             23.0     10-21    142  |  111<H>  |  35<H>  ----------------------------<  123<H>  3.8   |  21<L>  |  1.22    Ca    9.1      21 Oct 2022 07:40  Phos  4.0     10-21  Mg     1.90     10-21    TPro  5.7<L>  /  Alb  3.1<L>  /  TBili  1.2  /  DBili  x   /  AST  23  /  ALT  14  /  AlkPhos  84  10-20        PT/INR - ( 19 Oct 2022 10:38 )   PT: 19.0 sec;   INR: 1.63 ratio         PTT - ( 19 Oct 2022 10:38 )  PTT:31.1 sec    I&O's Summary    BNP  RADIOLOGY & ADDITIONAL STUDIES:      PHYSICAL EXAM:    GENERAL: In no apparent distress, well nourished, and hydrated.  HEART: Irregular rate and rhythm; No murmurs, rubs, or gallops.  PULMONARY: Clear to auscultation and percussion.  No rales, wheezing, or rhonchi bilaterally.  ABDOMEN: Soft, Nontender, Nondistended; Bowel sounds present  EXTREMITIES:  Peripheral Pulses presents, LLE dressing in place intact  NEUROLOGICAL: AAOx2

## 2022-10-21 NOTE — DISCHARGE NOTE PROVIDER - PROVIDER TOKENS
PROVIDER:[TOKEN:[[id number]]],FREE:[LAST:[[facility]],PHONE:[phone]],FAX:[(   )    -],ADDRESS:[[address] [place] [address]]] FREE:[LAST:[[facility]],PHONE:[phone]],FAX:[(   )    -],ADDRESS:[[address] [place] [address]]],PROVIDER:[TOKEN:[[id number]],ESTABLISHEDPATIENT:[T]]

## 2022-10-21 NOTE — PROGRESS NOTE ADULT - ASSESSMENT
87F PMHx afib (eliquis), HLD, hypothyroid, bradycardia, leg + sacral ulcers presents from [place] with open bleeding wound.    Impression: Bleeding wound on Eliquis iso hx of leg ulcers and recent PT work

## 2022-10-21 NOTE — PROGRESS NOTE ADULT - ATTENDING COMMENTS
Patient seen and examined at bedside. LLE hematoma over tibia, s/p beside debridement 10/19, low concern for compartment syndrome as per surgery, wound care - can be continued at [place] and plastics consult - outpatient , monitor hgb, holding anticoagulation, pain control   Afib w/ Slow VR, holding Eliquis d/t hematoma, monitor HR. Will follow up with surgery Re: when safe to re-start AC. Pt CHADSVASC 3 - age and female   Dispo: over the weekend back to [facility] if hemoglobin stable

## 2022-10-21 NOTE — PROGRESS NOTE ADULT - PROBLEM SELECTOR PLAN 6
DV ppx: not indicated iso bleed and leg wounds     Dispo: Likely back to [facility] can consider PT consult as wound improves    ##Opthalmic condition  Unclear hx per pt  Plan  -continue home meds including oral prednisone

## 2022-10-21 NOTE — DISCHARGE NOTE PROVIDER - NSDCCPCAREPLAN_GEN_ALL_CORE_FT
PRINCIPAL DISCHARGE DIAGNOSIS  Diagnosis: Open wound  Assessment and Plan of Treatment: You presented from [facility] with a bleeding wound. The surgery and woundcare team removed damaged tissue, cleaned the wound, and treated it to aid in the healing process. The plastic surgery team has provided follow up information for when the wound is ready to be closed.  The team at [facility] should continue to take care of your wound. When they feel the wound is ready for closure, please follow up with the plastic surgeon.   You should XXXXXEliquis RecsXXXXXXXX.  If you experience additional large amounts of bleeding, please return to the emergency room.       PRINCIPAL DISCHARGE DIAGNOSIS  Diagnosis: Open wound  Assessment and Plan of Treatment: You presented from [facility] [place] with a bleeding wound. The surgery and woundcare team removed damaged tissue, cleaned the wound, and treated it to aid in the healing process. Dr. Wick [phone] wsa the plastic surgeon who assessed photos of your wound. He recommended continued wound care with a wound-vac until enough healing has occured that he, or another surgeon, may properly close the wound.   The team at [facility] should continue to take care of your wound and apply a wound vac to assist healing. When they feel the wound is ready for closure, please follow up with a plastic surgeon.  You should continue to take eliquis at a new dose of 2.5mg twice daily.  You should participate in physical therapy and avoid anything that causes pain, especially the machine that may have percipitated these injuries. Your physical therapist may recomend modifed exercises to avoid direct pressure to the left lower-leg, but you should attempt to be as active as possible to ensure continued recovery.  If you experience additional large amounts of bleeding, please return to the emergency room.  The following wound care recomendations are provided for you and the staff at [facility]:  Left anterior lower leg:  - Topical Recommendations: gently cleanse with NS, Pat dry. Apply Liquid barrier film to periwound skin. Apply xeroform to wound base, cover with 4x4 gauze and abdominal pads. Once dressing is in place apply sween 24 moisturizer to intact skin of leg and foot, avoid between toes. Secure with kerlix wrap and ace bandage. Change daily.  - Right leg apply sween 24 daily  - Continue to offload heels with complete cair boots.  - consider use of silicone contact layer to base for atraumatic dressing removal, and initiate at lower suction  mmHg. If chris red blood is noted in VAC canister, discontinue VAC therapy, obtain hemostasis, and continue with recommended dressing above. Follow up with outpatient Plastic Surgery MD as instructed.        SECONDARY DISCHARGE DIAGNOSES  Diagnosis: Skin ulcer of sacral region  Assessment and Plan of Treatment: The following wound care recommendations are provided for you and the staff at [facility].  Sacral stage 4 pressure injury  - Topical recommendations: cleanse with NS. Pat dry. Apply Liquid barrier film to periwound skin. Apply Aquacel hydrofiber to wound base, cover with silicone foam with border. Change daily and prn with episodes of incontinence.  -Continue w/ low air loss fluidized bed surface   - Continue turning and positioning w/ offloading assistive devices as per protocol  - Continue w/ Pericare as per protocol, continue use of single breathable incontinence pad.     PRINCIPAL DISCHARGE DIAGNOSIS  Diagnosis: Open wound  Assessment and Plan of Treatment: You presented from [place] with a bleeding wound. The surgery and woundcare team removed damaged tissue, cleaned the wound, and treated it to aid in the healing process. Dr. Wick [phone] wsa the plastic surgeon who assessed photos of your wound. He recommended continued wound care with a wound-vac until enough healing has occured that he, or another surgeon, may properly close the wound.   The team at [facility] should continue to take care of your wound and apply a wound vac to assist healing. When they feel the wound is ready for closure, please follow up with a plastic surgeon.  You should continue to take eliquis 5mg twice daily.  You should participate in physical therapy and avoid anything that causes pain, especially the machine that may have percipitated these injuries. Your physical therapist may recomend modifed exercises to avoid direct pressure to the left lower-leg, but you should attempt to be as active as possible to ensure continued recovery.  If you experience additional large amounts of bleeding, please return to the emergency room.  The following wound care recomendations are provided for you and the staff at [facility]:  Left anterior lower leg:  - Topical Recommendations: gently cleanse with NS, Pat dry. Apply Liquid barrier film to periwound skin. Apply xeroform to wound base, cover with 4x4 gauze and abdominal pads. Once dressing is in place apply sween 24 moisturizer to intact skin of leg and foot, avoid between toes. Secure with kerlix wrap and ace bandage. Change daily.  - Right leg apply sween 24 daily  - Continue to offload heels with complete cair boots.  - consider use of silicone contact layer to base for atraumatic dressing removal, and initiate at lower suction  mmHg. If chris red blood is noted in VAC canister, discontinue VAC therapy, obtain hemostasis, and continue with recommended dressing above. Follow up with outpatient Plastic Surgery MD as instructed.        SECONDARY DISCHARGE DIAGNOSES  Diagnosis: Skin ulcer of sacral region  Assessment and Plan of Treatment: The following wound care recommendations are provided for you and the staff at [facility].  Sacral stage 4 pressure injury  - Topical recommendations: cleanse with NS. Pat dry. Apply Liquid barrier film to periwound skin. Apply Aquacel hydrofiber to wound base, cover with silicone foam with border. Change daily and prn with episodes of incontinence.  -Continue w/ low air loss fluidized bed surface   - Continue turning and positioning w/ offloading assistive devices as per protocol  - Continue w/ Pericare as per protocol, continue use of single breathable incontinence pad.

## 2022-10-21 NOTE — DISCHARGE NOTE PROVIDER - CARE PROVIDER_API CALL
Negrito Valdez (MD)  Plastic Surgery  [address] [place] [address], [address]  [place], [place] [id number]  Phone: [phone]  Fax: [phone]  Follow Up Time:     [facility],   [address] [facility]  Phone: [phone]  Fax: (   )    -  Follow Up Time:    [facility],   [address] [facility]  Phone: [phone]  Fax: (   )    -  Follow Up Time:     Amadeo Wick)  Plastic Surgery  [address] [place] [address]  Phone: [phone]  Fax: [phone]  Established Patient  Follow Up Time:

## 2022-10-21 NOTE — DISCHARGE NOTE PROVIDER - NSDCADMDATE_GEN_ALL_CORE_FT
[General Appearance - Well Developed] : well developed [General Appearance - Well Nourished] : well nourished [Normal Appearance] : normal appearance [Well Groomed] : well groomed 19-Oct-2022 17:05 [General Appearance - In No Acute Distress] : no acute distress [Edema] : no peripheral edema [] : no respiratory distress [Respiration, Rhythm And Depth] : normal respiratory rhythm and effort [Exaggerated Use Of Accessory Muscles For Inspiration] : no accessory muscle use [Oriented To Time, Place, And Person] : oriented to person, place, and time [Affect] : the affect was normal [Mood] : the mood was normal [Not Anxious] : not anxious

## 2022-10-21 NOTE — DISCHARGE NOTE PROVIDER - NSDCMRMEDTOKEN_GEN_ALL_CORE_FT
ALPRAZolam 0.25 mg oral tablet: 1 tab(s) orally 2 times a day  brimonidine 0.2% ophthalmic solution: 1 drop(s) to each affected eye 3 times a day  brinzolamide 1% ophthalmic suspension: 1 drop(s) to each affected eye 3 times a day  Crestor 20 mg oral tablet: 1 tab(s) orally once a day  Eliquis 5 mg oral tablet: 1 tab(s) orally 2 times a day  Hydrocil 2.4 g/3.7 g oral powder for reconstitution: 1 packet(s) orally once a day  ketorolac 0.5% ophthalmic solution: 1 drop(s) to each affected eye once a day (at bedtime)  levothyroxine 50 mcg (0.05 mg) oral tablet: 1 tab(s) orally once a day  losartan 50 mg oral tablet: 1 tab(s) orally once a day  Melatonin 3 mg oral tablet: 1 tab(s) orally once a day (at bedtime)  Multiple Vitamins oral capsule: 1 cap(s) orally once a day  Neurontin 100 mg oral capsule: 1 cap(s) orally 3 times a day  nystatin 100,000 units/g topical powder: Apply topically to affected area 2 times a day  predniSONE 5 mg oral tablet: 1 tab(s) orally once a day  Senna 8.6 mg oral tablet: 2 tab(s) orally once a day (at bedtime)  Tylenol 325 mg oral capsule: 1 cap(s) orally every 6 hours, As Needed  zinc oxide 20% topical ointment: Apply topically to affected area 2 times a day   ALPRAZolam 0.25 mg oral tablet: 1 tab(s) orally 2 times a day  brimonidine 0.2% ophthalmic solution: 1 drop(s) to each affected eye 3 times a day  brinzolamide 1% ophthalmic suspension: 1 drop(s) to each affected eye 3 times a day  Crestor 20 mg oral tablet: 1 tab(s) orally once a day  dorzolamide 2% ophthalmic solution: 1 drop(s) to each affected eye 3 times a day  Eliquis 5 mg oral tablet: 1 tab(s) orally 2 times a day  Hydrocil 2.4 g/3.7 g oral powder for reconstitution: 1 packet(s) orally once a day  ketorolac 0.5% ophthalmic solution: 1 drop(s) to each affected eye once a day (at bedtime)  levothyroxine 50 mcg (0.05 mg) oral tablet: 1 tab(s) orally once a day  losartan 50 mg oral tablet: 1 tab(s) orally once a day  Melatonin 3 mg oral tablet: 1 tab(s) orally once a day (at bedtime)  Multiple Vitamins oral capsule: 1 cap(s) orally once a day  Neurontin 100 mg oral capsule: 1 cap(s) orally 3 times a day  nystatin 100,000 units/g topical powder: Apply topically to affected area 2 times a day  predniSONE 5 mg oral tablet: 1 tab(s) orally once a day  Senna 8.6 mg oral tablet: 2 tab(s) orally once a day (at bedtime)  Tylenol 325 mg oral capsule: 1 cap(s) orally every 6 hours, As Needed  zinc oxide 20% topical ointment: Apply topically to affected area 2 times a day   ALPRAZolam 0.25 mg oral tablet: 1 tab(s) orally 2 times a day  apixaban 2.5 mg oral tablet: 1 tab(s) orally 2 times a day  brimonidine 0.2% ophthalmic solution: 1 drop(s) to each affected eye 3 times a day  brinzolamide 1% ophthalmic suspension: 1 drop(s) to each affected eye 3 times a day  Crestor 20 mg oral tablet: 1 tab(s) orally once a day  dorzolamide 2% ophthalmic solution: 1 drop(s) to each affected eye 3 times a day  Hydrocil 2.4 g/3.7 g oral powder for reconstitution: 1 packet(s) orally once a day  ketorolac 0.5% ophthalmic solution: 1 drop(s) to each affected eye once a day (at bedtime)  levothyroxine 50 mcg (0.05 mg) oral tablet: 1 tab(s) orally once a day  losartan 50 mg oral tablet: 1 tab(s) orally once a day  Melatonin 3 mg oral tablet: 1 tab(s) orally once a day (at bedtime)  Multiple Vitamins oral capsule: 1 cap(s) orally once a day  Neurontin 100 mg oral capsule: 1 cap(s) orally 3 times a day  nystatin 100,000 units/g topical powder: Apply topically to affected area 2 times a day  predniSONE 5 mg oral tablet: 1 tab(s) orally once a day  Senna 8.6 mg oral tablet: 2 tab(s) orally once a day (at bedtime)  Tylenol 325 mg oral capsule: 1 cap(s) orally every 6 hours, As Needed  zinc oxide 20% topical ointment: Apply topically to affected area 2 times a day   ALPRAZolam 0.25 mg oral tablet: 1 tab(s) orally 2 times a day  apixaban 2.5 mg oral tablet: 1 tab(s) orally 2 times a day  brimonidine 0.2% ophthalmic solution: 1 drop(s) to each affected eye 3 times a day  brinzolamide 1% ophthalmic suspension: 1 drop(s) to each affected eye 3 times a day  Crestor 20 mg oral tablet: 1 tab(s) orally once a day  Hydrocil 2.4 g/3.7 g oral powder for reconstitution: 1 packet(s) orally once a day  ketorolac 0.5% ophthalmic solution: 1 drop(s) to each affected eye once a day (at bedtime)  levothyroxine 50 mcg (0.05 mg) oral tablet: 1 tab(s) orally once a day  losartan 50 mg oral tablet: 1 tab(s) orally once a day  Melatonin 3 mg oral tablet: 1 tab(s) orally once a day (at bedtime)  Multiple Vitamins oral capsule: 1 cap(s) orally once a day  Neurontin 100 mg oral capsule: 1 cap(s) orally 3 times a day  nystatin 100,000 units/g topical powder: Apply topically to affected area 2 times a day  predniSONE 5 mg oral tablet: 1 tab(s) orally once a day  Senna 8.6 mg oral tablet: 2 tab(s) orally once a day (at bedtime)  Tylenol 325 mg oral capsule: 1 cap(s) orally every 6 hours, As Needed  zinc oxide 20% topical ointment: Apply topically to affected area 2 times a day   ALPRAZolam 0.25 mg oral tablet: 1 tab(s) orally 2 times a day  apixaban 5 mg oral tablet: 1 tab(s) orally 2 times a day  brimonidine 0.2% ophthalmic solution: 1 drop(s) to each affected eye 3 times a day  brinzolamide 1% ophthalmic suspension: 1 drop(s) to each affected eye 3 times a day  Crestor 20 mg oral tablet: 1 tab(s) orally once a day  Hydrocil 2.4 g/3.7 g oral powder for reconstitution: 1 packet(s) orally once a day  ketorolac 0.5% ophthalmic solution: 1 drop(s) to each affected eye once a day (at bedtime)  levothyroxine 50 mcg (0.05 mg) oral tablet: 1 tab(s) orally once a day  losartan 50 mg oral tablet: 1 tab(s) orally once a day  Melatonin 3 mg oral tablet: 1 tab(s) orally once a day (at bedtime)  Multiple Vitamins oral capsule: 1 cap(s) orally once a day  Neurontin 100 mg oral capsule: 1 cap(s) orally 3 times a day  nystatin 100,000 units/g topical powder: Apply topically to affected area 2 times a day  predniSONE 5 mg oral tablet: 1 tab(s) orally once a day  Senna 8.6 mg oral tablet: 2 tab(s) orally once a day (at bedtime)  Tylenol 325 mg oral capsule: 1 cap(s) orally every 6 hours, As Needed  zinc oxide 20% topical ointment: Apply topically to affected area 2 times a day

## 2022-10-21 NOTE — PROGRESS NOTE ADULT - SUBJECTIVE AND OBJECTIVE BOX
PROGRESS NOTE:       Patient is a 87y old  Female who presents with a chief complaint of Hematoma (20 Oct 2022 16:50)      SUBJECTIVE / OVERNIGHT EVENTS: NAEON. Poor sleep in hospital.    ADDITIONAL REVIEW OF SYSTEMS: Denies new symptoms. Denies pain. Limited ROS pt requesting to be allowed to sleep.      PHYSICAL EXAM:  Vital Signs Last 24 Hrs  T(C): 36.8 (21 Oct 2022 05:30), Max: 36.9 (20 Oct 2022 21:46)  T(F): 98.3 (21 Oct 2022 05:30), Max: 98.5 (20 Oct 2022 21:46)  HR: 53 (21 Oct 2022 05:30) (48 - 53)  BP: 125/53 (21 Oct 2022 05:30) (116/55 - 125/53)  BP(mean): --  RR: 17 (21 Oct 2022 05:30) (16 - 17)  SpO2: 98% (21 Oct 2022 05:30) (97% - 98%)    Parameters below as of 21 Oct 2022 05:30  Patient On (Oxygen Delivery Method): room air      GENERAL: No acute distress, well-developed  HEAD:  Atraumatic, Normocephalic  EYES: EOM grossly intact,  conjunctiva and sclera clear  CHEST/LUNG: CTAB; No wheezes, rales, or rhonchi  HEART: Regular rate and rhythm; No murmurs, rubs, or gallops  ABDOMEN: Soft, non-tender, non-distended; normal bowel sounds, no organomegaly  EXTREMITIES: LLE bandaged  NEUROLOGY: A&O x 3, no focal deficits  SKIN: No rashes or lesions    MEDICATIONS  (STANDING):  ALPRAZolam 0.25 milliGRAM(s) Oral two times a day  atorvastatin 80 milliGRAM(s) Oral at bedtime  brimonidine 0.2% Ophthalmic Solution 1 Drop(s) Both EYES three times a day  dorzolamide 2% Ophthalmic Solution 1 Drop(s) Both EYES three times a day  gabapentin 100 milliGRAM(s) Oral three times a day  ketorolac 0.5% Ophthalmic Solution 1 Drop(s) Both EYES at bedtime  levothyroxine 50 MICROGram(s) Oral daily  melatonin 3 milliGRAM(s) Oral at bedtime  multivitamin 1 Tablet(s) Oral daily  predniSONE   Tablet 5 milliGRAM(s) Oral daily  senna 2 Tablet(s) Oral at bedtime    MEDICATIONS  (PRN):  acetaminophen     Tablet .. 650 milliGRAM(s) Oral every 6 hours PRN Temp greater or equal to 38C (100.4F), Mild Pain (1 - 3), Moderate Pain (4 - 6)        I&O's Summary        LABS:  CAPILLARY BLOOD GLUCOSE      POCT Blood Glucose.: 102 mg/dL (20 Oct 2022 12:19)                          9.3    13.21 )-----------( 251      ( 20 Oct 2022 05:58 )             29.1     10-21    142  |  111<H>  |  35<H>  ----------------------------<  123<H>  3.8   |  21<L>  |  1.22    Ca    9.1      21 Oct 2022 07:40  Phos  4.0     10-21  Mg     1.90     10-21    TPro  5.7<L>  /  Alb  3.1<L>  /  TBili  1.2  /  DBili  x   /  AST  23  /  ALT  14  /  AlkPhos  84  10-20    PT/INR - ( 19 Oct 2022 10:38 )   PT: 19.0 sec;   INR: 1.63 ratio         PTT - ( 19 Oct 2022 10:38 )  PTT:31.1 sec            Culture - Blood (collected 19 Oct 2022 12:42)  Source: .Blood Blood  Preliminary Report (20 Oct 2022 15:02):    No growth to date.    Culture - Blood (collected 19 Oct 2022 12:20)  Source: .Blood Blood  Preliminary Report (20 Oct 2022 15:02):    No growth to date.          RADIOLOGY & ADDITIONAL TESTS:      COORDINATION OF CARE:

## 2022-10-21 NOTE — DISCHARGE NOTE PROVIDER - ATTENDING DISCHARGE PHYSICAL EXAMINATION:
GENERAL: No acute distress, well-developed  HEAD:  Atraumatic, Normocephalic  EYES: EOM grossly intact,  conjunctiva and sclera clear  CHEST/LUNG: CTAB; No wheezes, rales, or rhonchi  HEART: Slow rate and seemingly regular rhythm; No murmurs, rubs, or gallops  ABDOMEN: Soft, non-tender, non-distended; normal bowel sounds, no organomegaly  EXTREMITIES:  No clubbing, cyanosis, or edema. LLE bandaged without evidence of bleeding.  NEUROLOGY: A&O x 3, no focal deficits  SKIN: No rashes or lesions

## 2022-10-22 LAB
ANION GAP SERPL CALC-SCNC: 11 MMOL/L — SIGNIFICANT CHANGE UP (ref 7–14)
BUN SERPL-MCNC: 34 MG/DL — HIGH (ref 7–23)
CALCIUM SERPL-MCNC: 8.8 MG/DL — SIGNIFICANT CHANGE UP (ref 8.4–10.5)
CHLORIDE SERPL-SCNC: 110 MMOL/L — HIGH (ref 98–107)
CO2 SERPL-SCNC: 21 MMOL/L — LOW (ref 22–31)
CREAT SERPL-MCNC: 1.04 MG/DL — SIGNIFICANT CHANGE UP (ref 0.5–1.3)
EGFR: 52 ML/MIN/1.73M2 — LOW
GLUCOSE SERPL-MCNC: 96 MG/DL — SIGNIFICANT CHANGE UP (ref 70–99)
HCT VFR BLD CALC: 24 % — LOW (ref 34.5–45)
HGB BLD-MCNC: 7.5 G/DL — LOW (ref 11.5–15.5)
MAGNESIUM SERPL-MCNC: 1.9 MG/DL — SIGNIFICANT CHANGE UP (ref 1.6–2.6)
MCHC RBC-ENTMCNC: 27.2 PG — SIGNIFICANT CHANGE UP (ref 27–34)
MCHC RBC-ENTMCNC: 31.3 GM/DL — LOW (ref 32–36)
MCV RBC AUTO: 87 FL — SIGNIFICANT CHANGE UP (ref 80–100)
NRBC # BLD: 0 /100 WBCS — SIGNIFICANT CHANGE UP (ref 0–0)
NRBC # FLD: 0 K/UL — SIGNIFICANT CHANGE UP (ref 0–0)
PHOSPHATE SERPL-MCNC: 3.2 MG/DL — SIGNIFICANT CHANGE UP (ref 2.5–4.5)
PLATELET # BLD AUTO: 214 K/UL — SIGNIFICANT CHANGE UP (ref 150–400)
POTASSIUM SERPL-MCNC: 3.8 MMOL/L — SIGNIFICANT CHANGE UP (ref 3.5–5.3)
POTASSIUM SERPL-SCNC: 3.8 MMOL/L — SIGNIFICANT CHANGE UP (ref 3.5–5.3)
RBC # BLD: 2.76 M/UL — LOW (ref 3.8–5.2)
RBC # FLD: 16.2 % — HIGH (ref 10.3–14.5)
SODIUM SERPL-SCNC: 142 MMOL/L — SIGNIFICANT CHANGE UP (ref 135–145)
WBC # BLD: 9.9 K/UL — SIGNIFICANT CHANGE UP (ref 3.8–10.5)
WBC # FLD AUTO: 9.9 K/UL — SIGNIFICANT CHANGE UP (ref 3.8–10.5)

## 2022-10-22 PROCEDURE — 99232 SBSQ HOSP IP/OBS MODERATE 35: CPT

## 2022-10-22 RX ORDER — DORZOLAMIDE HYDROCHLORIDE 20 MG/ML
1 SOLUTION/ DROPS OPHTHALMIC
Qty: 0 | Refills: 0 | DISCHARGE
Start: 2022-10-22

## 2022-10-22 RX ADMIN — BRIMONIDINE TARTRATE 1 DROP(S): 2 SOLUTION/ DROPS OPHTHALMIC at 05:29

## 2022-10-22 RX ADMIN — Medication 0.25 MILLIGRAM(S): at 05:28

## 2022-10-22 RX ADMIN — Medication 1 DROP(S): at 21:36

## 2022-10-22 RX ADMIN — Medication 3 MILLIGRAM(S): at 21:35

## 2022-10-22 RX ADMIN — DORZOLAMIDE HYDROCHLORIDE 1 DROP(S): 20 SOLUTION/ DROPS OPHTHALMIC at 21:36

## 2022-10-22 RX ADMIN — Medication 50 MICROGRAM(S): at 05:28

## 2022-10-22 RX ADMIN — Medication 1 TABLET(S): at 12:37

## 2022-10-22 RX ADMIN — Medication 5 MILLIGRAM(S): at 05:29

## 2022-10-22 RX ADMIN — SENNA PLUS 2 TABLET(S): 8.6 TABLET ORAL at 21:35

## 2022-10-22 RX ADMIN — GABAPENTIN 100 MILLIGRAM(S): 400 CAPSULE ORAL at 21:35

## 2022-10-22 RX ADMIN — GABAPENTIN 100 MILLIGRAM(S): 400 CAPSULE ORAL at 14:25

## 2022-10-22 RX ADMIN — BRIMONIDINE TARTRATE 1 DROP(S): 2 SOLUTION/ DROPS OPHTHALMIC at 14:25

## 2022-10-22 RX ADMIN — GABAPENTIN 100 MILLIGRAM(S): 400 CAPSULE ORAL at 05:29

## 2022-10-22 RX ADMIN — Medication 0.25 MILLIGRAM(S): at 18:11

## 2022-10-22 RX ADMIN — DORZOLAMIDE HYDROCHLORIDE 1 DROP(S): 20 SOLUTION/ DROPS OPHTHALMIC at 14:25

## 2022-10-22 RX ADMIN — DORZOLAMIDE HYDROCHLORIDE 1 DROP(S): 20 SOLUTION/ DROPS OPHTHALMIC at 05:29

## 2022-10-22 RX ADMIN — BRIMONIDINE TARTRATE 1 DROP(S): 2 SOLUTION/ DROPS OPHTHALMIC at 21:36

## 2022-10-22 RX ADMIN — ATORVASTATIN CALCIUM 80 MILLIGRAM(S): 80 TABLET, FILM COATED ORAL at 21:35

## 2022-10-22 NOTE — DIETITIAN INITIAL EVALUATION ADULT - PERTINENT LABORATORY DATA
10-22    142  |  110<H>  |  34<H>  ----------------------------<  96  3.8   |  21<L>  |  1.04    Ca    8.8      22 Oct 2022 06:03  Phos  3.2     10-22  Mg     1.90     10-22

## 2022-10-22 NOTE — DIETITIAN INITIAL EVALUATION ADULT - OTHER INFO
87 year old female with a PMH of Afib, HLD, hypothyroid, bradycardia, leg + sacral ulcers presents from [place] with open bleeding wound per chart.    Patient is currently on a regular diet per chart. Reported no issues w/ lunch today. No GI distress or chewing/swallowing difficulties reported. Has no food allergies per chart. Unable to obtain UBW at this time. No weights noted per HIE. Per HIE, noted with height 170.2 cm (6/7/22). ABW is 86.7 kg (10/20) per chart. Per wound care note (10/20), noted with sacral stage 3 pressure injury. Noted with +1 generalized edema per RN flow sheet.

## 2022-10-22 NOTE — PROGRESS NOTE ADULT - ATTENDING COMMENTS
Patient seen and examined at bedside. Patient denies any pain around her LLE wound however states that she only notices pain when there is movement or during dressing changes. Otherwise denies any acute complaints. Exam notable for LLE dressing, no drainage or purulence noted, dressing c/d/i     Briefly this is a 88 y/o F with history of atrial fibrillation (on eliquis), HLD, hypothyroidism, bradycardia, leg and sacral wounds presenting from [facility] due to leg wound. Found to have LLE hematoma in setting of recent injury now s/p debridement by surgery on 10/19.    #LLE Wound/Hematoma  - now s/p beside debridement 10/19   - low concern for compartment syndrome as per surgery   - wound care eval noted, can be continued at [place]  - plastics consulted - outpatient follow up with Dr. Valdez for wound closure,   - monitor hgb, overall remaining stable   #Afib w/ Slow VR,   - holding Eliquis d/t hematoma   - monitor HR on tele  - will follow up with surgery Re: when safe to re-start AC.   - Pt CHADSVASC 3 - age and female. Despite risk would still favor holding AC for additional 3-4 days to prevent formation of new hematoma    Dispo: Overall wound and hemoglobin stable. Patient medically stable for return to [facility], Would opt to hold eliquis for several more days to reduce risk of new hematoma formation.    Discussed with HS1

## 2022-10-22 NOTE — PHYSICAL THERAPY INITIAL EVALUATION ADULT - ADDITIONAL COMMENTS
pt admitted to [facility] from [facility]     Pt. left comfortable in bed with all tubes/lines intact, call bell in reach and in NAD.

## 2022-10-22 NOTE — DIETITIAN INITIAL EVALUATION ADULT - ADD RECOMMEND
Continue diet as ordered. Continue w/ multivitamin supplementation. Document PO intake to monitor trend.

## 2022-10-22 NOTE — DIETITIAN INITIAL EVALUATION ADULT - ORAL INTAKE PTA/DIET HISTORY
Patient seen for assessment, not receptive to interview at this time. Chart review done. Per [facility] transfer records, patient was on a no added salt/regular textured diet w/ Javier BID, LPS TID and ensure plus TID.

## 2022-10-22 NOTE — DIETITIAN INITIAL EVALUATION ADULT - PERTINENT MEDS FT
MEDICATIONS  (STANDING):  ALPRAZolam 0.25 milliGRAM(s) Oral two times a day  atorvastatin 80 milliGRAM(s) Oral at bedtime  brimonidine 0.2% Ophthalmic Solution 1 Drop(s) Both EYES three times a day  dorzolamide 2% Ophthalmic Solution 1 Drop(s) Both EYES three times a day  gabapentin 100 milliGRAM(s) Oral three times a day  ketorolac 0.5% Ophthalmic Solution 1 Drop(s) Both EYES at bedtime  levothyroxine 50 MICROGram(s) Oral daily  melatonin 3 milliGRAM(s) Oral at bedtime  multivitamin 1 Tablet(s) Oral daily  predniSONE   Tablet 5 milliGRAM(s) Oral daily  senna 2 Tablet(s) Oral at bedtime    MEDICATIONS  (PRN):  acetaminophen     Tablet .. 650 milliGRAM(s) Oral every 6 hours PRN Temp greater or equal to 38C (100.4F), Mild Pain (1 - 3), Moderate Pain (4 - 6)

## 2022-10-22 NOTE — PROGRESS NOTE ADULT - ASSESSMENT
87F PMHx afib (eliquis), HLD, hypothyroid, bradycardia, leg + sacral ulcers presents from [place] with open bleeding wound.    Impression: Bleeding wound on Eliquis iso hx of leg ulcers and recent PT work

## 2022-10-22 NOTE — PHYSICAL THERAPY INITIAL EVALUATION ADULT - PERTINENT HX OF CURRENT PROBLEM, REHAB EVAL
87 year old Female PMH: afib, HLD, hypothyroid, bradycardia, leg + sacral ulcers presents from [place] after c/o Left LE tightness and pain

## 2022-10-22 NOTE — PROGRESS NOTE ADULT - SUBJECTIVE AND OBJECTIVE BOX
Marvin Malhotra MD  Internal Medicine, PGY-2  Please Contact via TEAMS    SUBJECTIVE / OVERNIGHT EVENTS:  - Pt seen and examined at bedside  - JEREMIAS    MEDICATIONS  (STANDING):  ALPRAZolam 0.25 milliGRAM(s) Oral two times a day  atorvastatin 80 milliGRAM(s) Oral at bedtime  brimonidine 0.2% Ophthalmic Solution 1 Drop(s) Both EYES three times a day  dorzolamide 2% Ophthalmic Solution 1 Drop(s) Both EYES three times a day  gabapentin 100 milliGRAM(s) Oral three times a day  ketorolac 0.5% Ophthalmic Solution 1 Drop(s) Both EYES at bedtime  levothyroxine 50 MICROGram(s) Oral daily  melatonin 3 milliGRAM(s) Oral at bedtime  multivitamin 1 Tablet(s) Oral daily  predniSONE   Tablet 5 milliGRAM(s) Oral daily  senna 2 Tablet(s) Oral at bedtime    MEDICATIONS  (PRN):  acetaminophen     Tablet .. 650 milliGRAM(s) Oral every 6 hours PRN Temp greater or equal to 38C (100.4F), Mild Pain (1 - 3), Moderate Pain (4 - 6)          PHYSICAL EXAM:  Vital Signs Last 24 Hrs  T(C): 36.9 (22 Oct 2022 05:20), Max: 37 (21 Oct 2022 13:47)  T(F): 98.5 (22 Oct 2022 05:20), Max: 98.6 (21 Oct 2022 13:47)  HR: 53 (22 Oct 2022 05:20) (47 - 53)  BP: 118/51 (22 Oct 2022 05:20) (110/44 - 123/49)  BP(mean): --  RR: 17 (22 Oct 2022 05:20) (16 - 17)  SpO2: 100% (22 Oct 2022 05:20) (100% - 100%)    Parameters below as of 22 Oct 2022 05:20  Patient On (Oxygen Delivery Method): room air        CAPILLARY BLOOD GLUCOSE        I&O's Summary      CONSTITUTIONAL: NAD, well-developed  RESPIRATORY: Normal respiratory effort; lungs are clear to auscultation bilaterally  CARDIOVASCULAR: Regular rate and rhythm, normal S1 and S2, no murmur/rub/gallop; No lower extremity edema; Peripheral pulses are 2+ bilaterally  ABDOMEN: Nontender to palpation, normoactive bowel sounds, no rebound/guarding; No hepatosplenomegaly  MUSCLOSKELETAL: no clubbing or cyanosis of digits; no joint swelling or tenderness to palpation  PSYCH: A+O to person, place, and time; affect appropriate    LABS:                        7.5    9.90  )-----------( 214      ( 22 Oct 2022 06:03 )             24.0     10-22    142  |  110<H>  |  34<H>  ----------------------------<  96  3.8   |  21<L>  |  1.04    Ca    8.8      22 Oct 2022 06:03  Phos  3.2     10-22  Mg     1.90     10-22                Culture - Blood (collected 19 Oct 2022 12:42)  Source: .Blood Blood  Preliminary Report (20 Oct 2022 15:02):    No growth to date.    Culture - Blood (collected 19 Oct 2022 12:20)  Source: .Blood Blood  Preliminary Report (20 Oct 2022 15:02):    No growth to date.        IMAGING:    [X] All pertinent imaging reviewed by me Marvin Malhotra MD  Internal Medicine, PGY-2  Please Contact via TEAMS    SUBJECTIVE / OVERNIGHT EVENTS:  - Pt seen and examined at bedside  - NAEON  - Patient requesting to see surgeon who saw her on initial consult, despite regular dressing changes with wound care and no reported changes with patient's wound.     MEDICATIONS  (STANDING):  ALPRAZolam 0.25 milliGRAM(s) Oral two times a day  atorvastatin 80 milliGRAM(s) Oral at bedtime  brimonidine 0.2% Ophthalmic Solution 1 Drop(s) Both EYES three times a day  dorzolamide 2% Ophthalmic Solution 1 Drop(s) Both EYES three times a day  gabapentin 100 milliGRAM(s) Oral three times a day  ketorolac 0.5% Ophthalmic Solution 1 Drop(s) Both EYES at bedtime  levothyroxine 50 MICROGram(s) Oral daily  melatonin 3 milliGRAM(s) Oral at bedtime  multivitamin 1 Tablet(s) Oral daily  predniSONE   Tablet 5 milliGRAM(s) Oral daily  senna 2 Tablet(s) Oral at bedtime    MEDICATIONS  (PRN):  acetaminophen     Tablet .. 650 milliGRAM(s) Oral every 6 hours PRN Temp greater or equal to 38C (100.4F), Mild Pain (1 - 3), Moderate Pain (4 - 6)          PHYSICAL EXAM:  Vital Signs Last 24 Hrs  T(C): 36.9 (22 Oct 2022 05:20), Max: 37 (21 Oct 2022 13:47)  T(F): 98.5 (22 Oct 2022 05:20), Max: 98.6 (21 Oct 2022 13:47)  HR: 53 (22 Oct 2022 05:20) (47 - 53)  BP: 118/51 (22 Oct 2022 05:20) (110/44 - 123/49)  BP(mean): --  RR: 17 (22 Oct 2022 05:20) (16 - 17)  SpO2: 100% (22 Oct 2022 05:20) (100% - 100%)    Parameters below as of 22 Oct 2022 05:20  Patient On (Oxygen Delivery Method): room air        CAPILLARY BLOOD GLUCOSE        I&O's Summary      CONSTITUTIONAL: NAD, well-developed  RESPIRATORY: Normal respiratory effort; lungs are clear to auscultation bilaterally  CARDIOVASCULAR: Regular rate and rhythm, normal S1 and S2, no murmur/rub/gallop; No lower extremity edema; Peripheral pulses are 2+ bilaterally  ABDOMEN: Nontender to palpation, normoactive bowel sounds, no rebound/guarding; No hepatosplenomegaly  MUSCLOSKELETAL: L leg wrapped w/ ace wrap, no significant TTP. Intact sensation and movement on LLE   PSYCH: A+O to person, place, and time; affect appropriate    LABS:                        7.5    9.90  )-----------( 214      ( 22 Oct 2022 06:03 )             24.0     10-22    142  |  110<H>  |  34<H>  ----------------------------<  96  3.8   |  21<L>  |  1.04    Ca    8.8      22 Oct 2022 06:03  Phos  3.2     10-22  Mg     1.90     10-22                Culture - Blood (collected 19 Oct 2022 12:42)  Source: .Blood Blood  Preliminary Report (20 Oct 2022 15:02):    No growth to date.    Culture - Blood (collected 19 Oct 2022 12:20)  Source: .Blood Blood  Preliminary Report (20 Oct 2022 15:02):    No growth to date.        IMAGING:    [X] All pertinent imaging reviewed by me Marvin Malhotra MD  Internal Medicine, PGY-2  Please Contact via TEAMS    SUBJECTIVE / OVERNIGHT EVENTS:  - Pt seen and examined at bedside  - NAEON  - Patient requesting to see surgeon who saw her on initial consult, despite regular dressing changes with wound care and no reported changes with patient's wound.     MEDICATIONS  (STANDING):  ALPRAZolam 0.25 milliGRAM(s) Oral two times a day  atorvastatin 80 milliGRAM(s) Oral at bedtime  brimonidine 0.2% Ophthalmic Solution 1 Drop(s) Both EYES three times a day  dorzolamide 2% Ophthalmic Solution 1 Drop(s) Both EYES three times a day  gabapentin 100 milliGRAM(s) Oral three times a day  ketorolac 0.5% Ophthalmic Solution 1 Drop(s) Both EYES at bedtime  levothyroxine 50 MICROGram(s) Oral daily  melatonin 3 milliGRAM(s) Oral at bedtime  multivitamin 1 Tablet(s) Oral daily  predniSONE   Tablet 5 milliGRAM(s) Oral daily  senna 2 Tablet(s) Oral at bedtime    MEDICATIONS  (PRN):  acetaminophen     Tablet .. 650 milliGRAM(s) Oral every 6 hours PRN Temp greater or equal to 38C (100.4F), Mild Pain (1 - 3), Moderate Pain (4 - 6)    PHYSICAL EXAM:  Vital Signs Last 24 Hrs  T(C): 36.9 (22 Oct 2022 05:20), Max: 37 (21 Oct 2022 13:47)  T(F): 98.5 (22 Oct 2022 05:20), Max: 98.6 (21 Oct 2022 13:47)  HR: 53 (22 Oct 2022 05:20) (47 - 53)  BP: 118/51 (22 Oct 2022 05:20) (110/44 - 123/49)  BP(mean): --  RR: 17 (22 Oct 2022 05:20) (16 - 17)  SpO2: 100% (22 Oct 2022 05:20) (100% - 100%)    Parameters below as of 22 Oct 2022 05:20  Patient On (Oxygen Delivery Method): room air    CAPILLARY BLOOD GLUCOSE    I&O's Summary    CONSTITUTIONAL: NAD, well-developed  RESPIRATORY: Normal respiratory effort; lungs are clear to auscultation bilaterally  CARDIOVASCULAR: Regular rate and rhythm, normal S1 and S2, no murmur/rub/gallop; No lower extremity edema; Peripheral pulses are 2+ bilaterally  ABDOMEN: Nontender to palpation, normoactive bowel sounds, no rebound/guarding; No hepatosplenomegaly  MUSCLOSKELETAL: L leg wrapped w/ ace wrap, no significant TTP. Intact sensation and movement on LLE   PSYCH: A+O to person, place, and time; affect appropriate    LABS:                        7.5    9.90  )-----------( 214      ( 22 Oct 2022 06:03 )             24.0     10-22    142  |  110<H>  |  34<H>  ----------------------------<  96  3.8   |  21<L>  |  1.04    Ca    8.8      22 Oct 2022 06:03  Phos  3.2     10-22  Mg     1.90     10-22    Culture - Blood (collected 19 Oct 2022 12:42)  Source: .Blood Blood  Preliminary Report (20 Oct 2022 15:02):    No growth to date.    Culture - Blood (collected 19 Oct 2022 12:20)  Source: .Blood Blood  Preliminary Report (20 Oct 2022 15:02):    No growth to date.    IMAGING:    [X] All pertinent imaging reviewed by me

## 2022-10-22 NOTE — PHYSICAL THERAPY INITIAL EVALUATION ADULT - RANGE OF MOTION EXAMINATION, REHAB EVAL
pt deferring Bilateral LE ROM assessment at this time, fear with movement./bilateral upper extremity ROM was WFL (within functional limits)

## 2022-10-22 NOTE — PROGRESS NOTE ADULT - PROBLEM SELECTOR PLAN 6
DV ppx: not indicated iso bleed and leg wounds     Dispo: Likely back to [facility] can consider PT consult as wound improves    ##Opthalmic condition  Unclear hx per pt  Plan  -continue home meds including oral prednisone DV ppx: not indicated iso bleed and leg wounds     Dispo: Medically cleared for d/c back to rehab.     ##Opthalmic condition  Unclear hx per pt  Plan  -continue home meds including oral prednisone

## 2022-10-22 NOTE — PROGRESS NOTE ADULT - PROBLEM SELECTOR PLAN 1
Picture in Surgery Consult note 10/19/22  S/P Debridement with surgery  Wound care consulted  Surgery following  S/P 1 unit blood    Plan  -F/U WC recs  -F/U Surgery recs  -Daily dressing changes  -Will consult plastics for wound closure Picture in Surgery Consult note 10/19/22  S/P Debridement with surgery  Wound care consulted  Surgery following  S/P 1 unit blood    Plan  -F/U WC recs  -F/U Surgery recs  -Daily dressing changes  -  f/u outpatient w/ plastics for wound closure.

## 2022-10-23 LAB
ANION GAP SERPL CALC-SCNC: 11 MMOL/L — SIGNIFICANT CHANGE UP (ref 7–14)
BUN SERPL-MCNC: 34 MG/DL — HIGH (ref 7–23)
CALCIUM SERPL-MCNC: 8.9 MG/DL — SIGNIFICANT CHANGE UP (ref 8.4–10.5)
CHLORIDE SERPL-SCNC: 108 MMOL/L — HIGH (ref 98–107)
CO2 SERPL-SCNC: 20 MMOL/L — LOW (ref 22–31)
CREAT SERPL-MCNC: 1.08 MG/DL — SIGNIFICANT CHANGE UP (ref 0.5–1.3)
EGFR: 50 ML/MIN/1.73M2 — LOW
GLUCOSE SERPL-MCNC: 125 MG/DL — HIGH (ref 70–99)
HCT VFR BLD CALC: 23.9 % — LOW (ref 34.5–45)
HGB BLD-MCNC: 7.3 G/DL — LOW (ref 11.5–15.5)
MAGNESIUM SERPL-MCNC: 2 MG/DL — SIGNIFICANT CHANGE UP (ref 1.6–2.6)
MCHC RBC-ENTMCNC: 26.6 PG — LOW (ref 27–34)
MCHC RBC-ENTMCNC: 30.5 GM/DL — LOW (ref 32–36)
MCV RBC AUTO: 87.2 FL — SIGNIFICANT CHANGE UP (ref 80–100)
NRBC # BLD: 0 /100 WBCS — SIGNIFICANT CHANGE UP (ref 0–0)
NRBC # FLD: 0 K/UL — SIGNIFICANT CHANGE UP (ref 0–0)
PHOSPHATE SERPL-MCNC: 3.2 MG/DL — SIGNIFICANT CHANGE UP (ref 2.5–4.5)
PLATELET # BLD AUTO: 268 K/UL — SIGNIFICANT CHANGE UP (ref 150–400)
POTASSIUM SERPL-MCNC: 4 MMOL/L — SIGNIFICANT CHANGE UP (ref 3.5–5.3)
POTASSIUM SERPL-SCNC: 4 MMOL/L — SIGNIFICANT CHANGE UP (ref 3.5–5.3)
RBC # BLD: 2.74 M/UL — LOW (ref 3.8–5.2)
RBC # FLD: 16.4 % — HIGH (ref 10.3–14.5)
SODIUM SERPL-SCNC: 139 MMOL/L — SIGNIFICANT CHANGE UP (ref 135–145)
WBC # BLD: 12.03 K/UL — HIGH (ref 3.8–10.5)
WBC # FLD AUTO: 12.03 K/UL — HIGH (ref 3.8–10.5)

## 2022-10-23 PROCEDURE — 99232 SBSQ HOSP IP/OBS MODERATE 35: CPT

## 2022-10-23 RX ADMIN — Medication 50 MICROGRAM(S): at 06:14

## 2022-10-23 RX ADMIN — Medication 5 MILLIGRAM(S): at 06:14

## 2022-10-23 RX ADMIN — Medication 0.25 MILLIGRAM(S): at 17:52

## 2022-10-23 RX ADMIN — ATORVASTATIN CALCIUM 80 MILLIGRAM(S): 80 TABLET, FILM COATED ORAL at 21:49

## 2022-10-23 RX ADMIN — GABAPENTIN 100 MILLIGRAM(S): 400 CAPSULE ORAL at 21:49

## 2022-10-23 RX ADMIN — DORZOLAMIDE HYDROCHLORIDE 1 DROP(S): 20 SOLUTION/ DROPS OPHTHALMIC at 21:50

## 2022-10-23 RX ADMIN — GABAPENTIN 100 MILLIGRAM(S): 400 CAPSULE ORAL at 15:30

## 2022-10-23 RX ADMIN — Medication 0.25 MILLIGRAM(S): at 06:14

## 2022-10-23 RX ADMIN — BRIMONIDINE TARTRATE 1 DROP(S): 2 SOLUTION/ DROPS OPHTHALMIC at 21:50

## 2022-10-23 RX ADMIN — DORZOLAMIDE HYDROCHLORIDE 1 DROP(S): 20 SOLUTION/ DROPS OPHTHALMIC at 06:13

## 2022-10-23 RX ADMIN — BRIMONIDINE TARTRATE 1 DROP(S): 2 SOLUTION/ DROPS OPHTHALMIC at 06:13

## 2022-10-23 RX ADMIN — Medication 1 DROP(S): at 21:58

## 2022-10-23 RX ADMIN — DORZOLAMIDE HYDROCHLORIDE 1 DROP(S): 20 SOLUTION/ DROPS OPHTHALMIC at 15:31

## 2022-10-23 RX ADMIN — GABAPENTIN 100 MILLIGRAM(S): 400 CAPSULE ORAL at 06:12

## 2022-10-23 RX ADMIN — Medication 1 TABLET(S): at 15:30

## 2022-10-23 RX ADMIN — BRIMONIDINE TARTRATE 1 DROP(S): 2 SOLUTION/ DROPS OPHTHALMIC at 15:30

## 2022-10-23 NOTE — PROGRESS NOTE ADULT - PROBLEM SELECTOR PLAN 1
Picture in Surgery Consult note 10/19/22  S/P Debridement with surgery  Wound care consulted  Surgery following  S/P 1 unit blood  HH (10/23)73<-7.5<-7.7<7.2<9.3 (10/20)    Plan  -F/U WC recs  -F/U Surgery recs  -Daily dressing changes  -  f/u outpatient w/ plastics for wound closure.  Trend [facility]

## 2022-10-23 NOTE — PROGRESS NOTE ADULT - PROBLEM SELECTOR PLAN 6
DV ppx: not indicated iso bleed and leg wounds     Dispo: Medically cleared for d/c back to rehab.     ##Uveitis   Plan  -continue home meds including oral prednisone

## 2022-10-23 NOTE — PROGRESS NOTE ADULT - SUBJECTIVE AND OBJECTIVE BOX
PROGRESS NOTE:       Patient is a 87y old  Female who presents with a chief complaint of Anemia due to acute blood loss     (22 Oct 2022 15:40)      SUBJECTIVE / OVERNIGHT EVENTS:    ADDITIONAL REVIEW OF SYSTEMS:      PHYSICAL EXAM:  Vital Signs Last 24 Hrs  T(C): 36.5 (23 Oct 2022 05:56), Max: 36.9 (22 Oct 2022 21:35)  T(F): 97.7 (23 Oct 2022 05:56), Max: 98.4 (22 Oct 2022 21:35)  HR: 50 (23 Oct 2022 05:56) (40 - 55)  BP: 117/48 (23 Oct 2022 05:56) (115/43 - 141/46)  BP(mean): --  RR: 17 (23 Oct 2022 05:56) (17 - 18)  SpO2: 99% (23 Oct 2022 05:56) (98% - 100%)    Parameters below as of 23 Oct 2022 05:56  Patient On (Oxygen Delivery Method): room air      GENERAL: No acute distress, well-developed  HEAD:  Atraumatic, Normocephalic  EYES: EOM grossly intact,  conjunctiva and sclera clear  CHEST/LUNG: CTAB; No wheezes, rales, or rhonchi  HEART: Regular rate and rhythm; No murmurs, rubs, or gallops  ABDOMEN: Soft, non-tender, non-distended; normal bowel sounds, no organomegaly  EXTREMITIES: LLE bandaged  NEUROLOGY: A&O x 3, no focal deficits  SKIN: No rashes or lesions      MEDICATIONS  (STANDING):  ALPRAZolam 0.25 milliGRAM(s) Oral two times a day  atorvastatin 80 milliGRAM(s) Oral at bedtime  brimonidine 0.2% Ophthalmic Solution 1 Drop(s) Both EYES three times a day  dorzolamide 2% Ophthalmic Solution 1 Drop(s) Both EYES three times a day  gabapentin 100 milliGRAM(s) Oral three times a day  ketorolac 0.5% Ophthalmic Solution 1 Drop(s) Both EYES at bedtime  levothyroxine 50 MICROGram(s) Oral daily  melatonin 3 milliGRAM(s) Oral at bedtime  multivitamin 1 Tablet(s) Oral daily  predniSONE   Tablet 5 milliGRAM(s) Oral daily  senna 2 Tablet(s) Oral at bedtime    MEDICATIONS  (PRN):  acetaminophen     Tablet .. 650 milliGRAM(s) Oral every 6 hours PRN Temp greater or equal to 38C (100.4F), Mild Pain (1 - 3), Moderate Pain (4 - 6)        I&O's Summary        LABS:  CAPILLARY BLOOD GLUCOSE                              7.3    12.03 )-----------( 268      ( 23 Oct 2022 05:59 )             23.9     10-23    139  |  108<H>  |  34<H>  ----------------------------<  125<H>  4.0   |  20<L>  |  1.08    Ca    8.9      23 Oct 2022 05:59  Phos  3.2     10-23  Mg     2.00     10-23                      RADIOLOGY & ADDITIONAL TESTS:      COORDINATION OF CARE:     PROGRESS NOTE:       Patient is a 87y old  Female who presents with a chief complaint of Anemia due to acute blood loss     (22 Oct 2022 15:40)    SUBJECTIVE / OVERNIGHT EVENTS:    ADDITIONAL REVIEW OF SYSTEMS:    PHYSICAL EXAM:  Vital Signs Last 24 Hrs  T(C): 36.5 (23 Oct 2022 05:56), Max: 36.9 (22 Oct 2022 21:35)  T(F): 97.7 (23 Oct 2022 05:56), Max: 98.4 (22 Oct 2022 21:35)  HR: 50 (23 Oct 2022 05:56) (40 - 55)  BP: 117/48 (23 Oct 2022 05:56) (115/43 - 141/46)  BP(mean): --  RR: 17 (23 Oct 2022 05:56) (17 - 18)  SpO2: 99% (23 Oct 2022 05:56) (98% - 100%)    Parameters below as of 23 Oct 2022 05:56  Patient On (Oxygen Delivery Method): room air    GENERAL: No acute distress, well-developed  HEAD:  Atraumatic, Normocephalic  EYES: EOM grossly intact,  conjunctiva and sclera clear  CHEST/LUNG: CTAB; No wheezes, rales, or rhonchi  HEART: Regular rate and rhythm; No murmurs, rubs, or gallops  ABDOMEN: Soft, non-tender, non-distended; normal bowel sounds, no organomegaly  EXTREMITIES: LLE bandaged  NEUROLOGY: A&O x 3, no focal deficits  SKIN: No rashes or lesions    MEDICATIONS  (STANDING):  ALPRAZolam 0.25 milliGRAM(s) Oral two times a day  atorvastatin 80 milliGRAM(s) Oral at bedtime  brimonidine 0.2% Ophthalmic Solution 1 Drop(s) Both EYES three times a day  dorzolamide 2% Ophthalmic Solution 1 Drop(s) Both EYES three times a day  gabapentin 100 milliGRAM(s) Oral three times a day  ketorolac 0.5% Ophthalmic Solution 1 Drop(s) Both EYES at bedtime  levothyroxine 50 MICROGram(s) Oral daily  melatonin 3 milliGRAM(s) Oral at bedtime  multivitamin 1 Tablet(s) Oral daily  predniSONE   Tablet 5 milliGRAM(s) Oral daily  senna 2 Tablet(s) Oral at bedtime    MEDICATIONS  (PRN):  acetaminophen     Tablet .. 650 milliGRAM(s) Oral every 6 hours PRN Temp greater or equal to 38C (100.4F), Mild Pain (1 - 3), Moderate Pain (4 - 6)    I&O's Summary    LABS:  CAPILLARY BLOOD GLUCOSE                     7.3    12.03 )-----------( 268      ( 23 Oct 2022 05:59 )             23.9     10-23    139  |  108<H>  |  34<H>  ----------------------------<  125<H>  4.0   |  20<L>  |  1.08    Ca    8.9      23 Oct 2022 05:59  Phos  3.2     10-23  Mg     2.00     10-23    RADIOLOGY & ADDITIONAL TESTS:    COORDINATION OF CARE:

## 2022-10-23 NOTE — PROGRESS NOTE ADULT - SUBJECTIVE AND OBJECTIVE BOX
SURGERY DAILY PROGRESS NOTE:       SUBJECTIVE/ROS: Patient seen and evaluated on AM rounds. DAYSI o/n.  Denies nausea, vomiting, chest pain, shortness of breath       OBJECTIVE:  Vital Signs Last 24 Hrs  T(C): 36.5 (23 Oct 2022 05:56), Max: 36.9 (22 Oct 2022 21:35)  T(F): 97.7 (23 Oct 2022 05:56), Max: 98.4 (22 Oct 2022 21:35)  HR: 50 (23 Oct 2022 05:56) (40 - 55)  BP: 117/48 (23 Oct 2022 05:56) (115/43 - 141/46)  BP(mean): --  RR: 17 (23 Oct 2022 05:56) (17 - 18)  SpO2: 99% (23 Oct 2022 05:56) (98% - 100%)    Parameters below as of 23 Oct 2022 05:56  Patient On (Oxygen Delivery Method): room air      I&O's Detail    Daily     Daily   MEDICATIONS  (STANDING):  ALPRAZolam 0.25 milliGRAM(s) Oral two times a day  atorvastatin 80 milliGRAM(s) Oral at bedtime  brimonidine 0.2% Ophthalmic Solution 1 Drop(s) Both EYES three times a day  dorzolamide 2% Ophthalmic Solution 1 Drop(s) Both EYES three times a day  gabapentin 100 milliGRAM(s) Oral three times a day  ketorolac 0.5% Ophthalmic Solution 1 Drop(s) Both EYES at bedtime  levothyroxine 50 MICROGram(s) Oral daily  melatonin 3 milliGRAM(s) Oral at bedtime  multivitamin 1 Tablet(s) Oral daily  predniSONE   Tablet 5 milliGRAM(s) Oral daily  senna 2 Tablet(s) Oral at bedtime    MEDICATIONS  (PRN):  acetaminophen     Tablet .. 650 milliGRAM(s) Oral every 6 hours PRN Temp greater or equal to 38C (100.4F), Mild Pain (1 - 3), Moderate Pain (4 - 6)      LABS:                        7.3    12.03 )-----------( 268      ( 23 Oct 2022 05:59 )             23.9     10-23    139  |  108<H>  |  34<H>  ----------------------------<  125<H>  4.0   |  20<L>  |  1.08    Ca    8.9      23 Oct 2022 05:59  Phos  3.2     10-23  Mg     2.00     10-23            PHYSICAL EXAM:  Constitutional: disoriented  Respiratory: non-labored breathing, patent airway  Gastrointestinal: abdomen soft, nontender, nondistended  Extremities:   - LLE: wrapped without continued bleeding

## 2022-10-23 NOTE — PROGRESS NOTE ADULT - ASSESSMENT
87F PMHx afib (eliquis), HLD, hypothyroid, bradycardia, leg + sacral ulcers presents from [place] with open bleeding wound.    Impression: Bleeding wound on Eliquis iso hx of leg ulcers and recent PT work

## 2022-10-23 NOTE — PROGRESS NOTE ADULT - ASSESSMENT
87F with hx of Afib on Eliquis (last dose yesterday), bradycardia, HLD, hypothyroid presents with LLE hematoma over tibia. Low concern for compartment syndrome based on physical exam. Now s/p 10/19 beside debridement.    RECOMMENDATIONS  - LLE without continued bleeding  - continue dressing changes per wound care   - B team signing off, please page with any additional questions    B team surgery   [id number]

## 2022-10-23 NOTE — PROGRESS NOTE ADULT - ATTENDING COMMENTS
Patient seen and examined at bedside. Pleasant and conversant. Stating that she feels well today. Denies any discomfort around her leg at this time. Denies any palpitations, CP, SOB, dizziness, n/v or abd pain. Exam notable for LLE wound in dressing. Dressing itself is c/d/i without any visible blood or purulence.    Briefly this is a 86 y/o F with history of atrial fibrillation (on eliquis), HLD, hypothyroidism, bradycardia, leg and sacral wounds presenting from [facility] due to leg wound. Found to have LLE hematoma in setting of recent injury now s/p debridement by surgery on 10/19.    #LLE Wound/Hematoma  - now s/p beside debridement 10/19   - low concern for compartment syndrome as per surgery   - wound care eval noted, can be continued at [place]  - plastics consulted - outpatient follow up with Dr. Valdez for wound closure,   - monitor hgb, overall remaining stable   #Afib w/ Slow VR,   - holding Eliquis d/t hematoma   - monitor HR on tele  - will follow up with surgery Re: when safe to re-start AC.   - Pt CHADSVASC 3 - age and female. Despite risk would still favor holding AC for additional 3-4 days to prevent formation of new hematoma    Dispo: Overall wound and hemoglobin stable. Patient medically stable for return to [facility], Would opt to hold eliquis for several more days to reduce risk of new hematoma formation. Patient at this time expressing that she would prefer to remain off the AC. Would advise further risk/benefits discussions with outpatient providers regarding AC in this elderly patient with one episode of bleeding already.     Discussed with HS1

## 2022-10-23 NOTE — CHART NOTE - NSCHARTNOTEFT_GEN_A_CORE
Spoke with Dr. Valdez, who recommends continued wound care, which may be done at rehab, and follow up with plastics outpatient. [phone]
Tele reviewed shows slow Afib w/ rates in 40s. BP stable. Would continue to follow recs from 10/21 note.    Gilberto Mederos MD  Chief Cardiology Fellow PGY-6  [facility]    Notes are not final until signed by attending  For all consults and questions:  [email or web address]   Login: [email or web address].
| Reference #: [id number]    Others' Prescriptions  Patient Name: Shawn Russo Date: 1935  Address: [address] [place] [id number]: Female  Rx Written	Rx Dispensed	Drug	Quantity	Days Supply	Prescriber Name	Prescriber Zaira #	Payment Method	Dispenser  10/13/2022	10/13/2022	alprazolam 0.25 mg tablet	60	30	Marjorie Landaverde N (MSN)	[id number]	Medicare	[employer]  09/09/2022	09/13/2022	alprazolam 0.25 mg tablet	60	30	Thiago Carias MD	[id number]	[place]	[employer]
Tele reviewed shows slow Afib w/ rates in 40s. BP stable. Would continue to follow recs from 10/21 note.    Gilberto Mederos MD  Chief Cardiology Fellow PGY-6  [facility]    Notes are not final until signed by attending  For all consults and questions:  [email or web address]   Login: [email or web address]

## 2022-10-24 LAB
ANION GAP SERPL CALC-SCNC: 8 MMOL/L — SIGNIFICANT CHANGE UP (ref 7–14)
BLD GP AB SCN SERPL QL: NEGATIVE — SIGNIFICANT CHANGE UP
BUN SERPL-MCNC: 35 MG/DL — HIGH (ref 7–23)
CALCIUM SERPL-MCNC: 8.9 MG/DL — SIGNIFICANT CHANGE UP (ref 8.4–10.5)
CHLORIDE SERPL-SCNC: 107 MMOL/L — SIGNIFICANT CHANGE UP (ref 98–107)
CO2 SERPL-SCNC: 21 MMOL/L — LOW (ref 22–31)
CREAT SERPL-MCNC: 0.96 MG/DL — SIGNIFICANT CHANGE UP (ref 0.5–1.3)
CULTURE RESULTS: SIGNIFICANT CHANGE UP
CULTURE RESULTS: SIGNIFICANT CHANGE UP
EGFR: 58 ML/MIN/1.73M2 — LOW
GLUCOSE SERPL-MCNC: 119 MG/DL — HIGH (ref 70–99)
HCT VFR BLD CALC: 21.9 % — LOW (ref 34.5–45)
HCT VFR BLD CALC: 24 % — LOW (ref 34.5–45)
HGB BLD-MCNC: 6.8 G/DL — CRITICAL LOW (ref 11.5–15.5)
HGB BLD-MCNC: 7.5 G/DL — LOW (ref 11.5–15.5)
MAGNESIUM SERPL-MCNC: 1.9 MG/DL — SIGNIFICANT CHANGE UP (ref 1.6–2.6)
MCHC RBC-ENTMCNC: 27.2 PG — SIGNIFICANT CHANGE UP (ref 27–34)
MCHC RBC-ENTMCNC: 27.3 PG — SIGNIFICANT CHANGE UP (ref 27–34)
MCHC RBC-ENTMCNC: 31.1 GM/DL — LOW (ref 32–36)
MCHC RBC-ENTMCNC: 31.3 GM/DL — LOW (ref 32–36)
MCV RBC AUTO: 87.3 FL — SIGNIFICANT CHANGE UP (ref 80–100)
MCV RBC AUTO: 87.6 FL — SIGNIFICANT CHANGE UP (ref 80–100)
NRBC # BLD: 0 /100 WBCS — SIGNIFICANT CHANGE UP (ref 0–0)
NRBC # BLD: 0 /100 WBCS — SIGNIFICANT CHANGE UP (ref 0–0)
NRBC # FLD: 0 K/UL — SIGNIFICANT CHANGE UP (ref 0–0)
NRBC # FLD: 0 K/UL — SIGNIFICANT CHANGE UP (ref 0–0)
PHOSPHATE SERPL-MCNC: 3.3 MG/DL — SIGNIFICANT CHANGE UP (ref 2.5–4.5)
PLATELET # BLD AUTO: 256 K/UL — SIGNIFICANT CHANGE UP (ref 150–400)
PLATELET # BLD AUTO: 266 K/UL — SIGNIFICANT CHANGE UP (ref 150–400)
POTASSIUM SERPL-MCNC: 4.2 MMOL/L — SIGNIFICANT CHANGE UP (ref 3.5–5.3)
POTASSIUM SERPL-SCNC: 4.2 MMOL/L — SIGNIFICANT CHANGE UP (ref 3.5–5.3)
RBC # BLD: 2.5 M/UL — LOW (ref 3.8–5.2)
RBC # BLD: 2.75 M/UL — LOW (ref 3.8–5.2)
RBC # FLD: 15.9 % — HIGH (ref 10.3–14.5)
RBC # FLD: 16.3 % — HIGH (ref 10.3–14.5)
RH IG SCN BLD-IMP: POSITIVE — SIGNIFICANT CHANGE UP
SODIUM SERPL-SCNC: 136 MMOL/L — SIGNIFICANT CHANGE UP (ref 135–145)
SPECIMEN SOURCE: SIGNIFICANT CHANGE UP
SPECIMEN SOURCE: SIGNIFICANT CHANGE UP
WBC # BLD: 10.53 K/UL — HIGH (ref 3.8–10.5)
WBC # BLD: 9.33 K/UL — SIGNIFICANT CHANGE UP (ref 3.8–10.5)
WBC # FLD AUTO: 10.53 K/UL — HIGH (ref 3.8–10.5)
WBC # FLD AUTO: 9.33 K/UL — SIGNIFICANT CHANGE UP (ref 3.8–10.5)

## 2022-10-24 PROCEDURE — 99232 SBSQ HOSP IP/OBS MODERATE 35: CPT

## 2022-10-24 PROCEDURE — 99233 SBSQ HOSP IP/OBS HIGH 50: CPT | Mod: GC

## 2022-10-24 RX ADMIN — DORZOLAMIDE HYDROCHLORIDE 1 DROP(S): 20 SOLUTION/ DROPS OPHTHALMIC at 05:49

## 2022-10-24 RX ADMIN — GABAPENTIN 100 MILLIGRAM(S): 400 CAPSULE ORAL at 05:50

## 2022-10-24 RX ADMIN — BRIMONIDINE TARTRATE 1 DROP(S): 2 SOLUTION/ DROPS OPHTHALMIC at 13:14

## 2022-10-24 RX ADMIN — ATORVASTATIN CALCIUM 80 MILLIGRAM(S): 80 TABLET, FILM COATED ORAL at 22:44

## 2022-10-24 RX ADMIN — DORZOLAMIDE HYDROCHLORIDE 1 DROP(S): 20 SOLUTION/ DROPS OPHTHALMIC at 22:43

## 2022-10-24 RX ADMIN — Medication 1 DROP(S): at 22:44

## 2022-10-24 RX ADMIN — Medication 0.25 MILLIGRAM(S): at 17:44

## 2022-10-24 RX ADMIN — Medication 1 TABLET(S): at 13:13

## 2022-10-24 RX ADMIN — DORZOLAMIDE HYDROCHLORIDE 1 DROP(S): 20 SOLUTION/ DROPS OPHTHALMIC at 13:14

## 2022-10-24 RX ADMIN — BRIMONIDINE TARTRATE 1 DROP(S): 2 SOLUTION/ DROPS OPHTHALMIC at 05:49

## 2022-10-24 RX ADMIN — GABAPENTIN 100 MILLIGRAM(S): 400 CAPSULE ORAL at 13:14

## 2022-10-24 RX ADMIN — GABAPENTIN 100 MILLIGRAM(S): 400 CAPSULE ORAL at 22:44

## 2022-10-24 RX ADMIN — Medication 50 MICROGRAM(S): at 05:50

## 2022-10-24 RX ADMIN — Medication 5 MILLIGRAM(S): at 05:50

## 2022-10-24 RX ADMIN — Medication 0.25 MILLIGRAM(S): at 05:50

## 2022-10-24 RX ADMIN — BRIMONIDINE TARTRATE 1 DROP(S): 2 SOLUTION/ DROPS OPHTHALMIC at 22:43

## 2022-10-24 NOTE — PROGRESS NOTE ADULT - ATTENDING COMMENTS
88 y/o F with history of atrial fibrillation (on eliquis), HLD, hypothyroidism, bradycardia, leg and sacral wounds presenting [facility]. Found to have LLE hematoma in setting of recent injury now s/p debridement by surgery on 10/19.    #LLE Wound/Hematoma  - now s/p beside debridement 10/19   - low concern for compartment syndrome as per surgery   - wound care eval noted, can be continued at [place]  - plastics consulted - outpatient follow up with Dr. Valdez for wound closure,   - monitor hgb, dropped to 6.8 on 10/24, transfusing 1 unit   - wound with serosanguinous dc, but not c/f new hematoma per wound care   - monitor h/h     #Afib w/ Slow VR,   - holding Eliquis d/t hematoma   - monitor HR on tele  - will follow up with surgery Re: when safe to re-start AC.   - Pt CHADSVASC 3 - age and female. Patient at this time expressing that she would prefer to remain off the AC. Revisit with patient prior to dc     Discussed with HS1

## 2022-10-24 NOTE — PROVIDER CONTACT NOTE (OTHER) - SITUATION
Pt has a BP of 100/43 and HR of 44. Parameter states to notify provider if DBP < 50.
Pt had 8 beats of VTach

## 2022-10-24 NOTE — PROGRESS NOTE ADULT - SUBJECTIVE AND OBJECTIVE BOX
PROGRESS NOTE:       Patient is a 87y old  Female who presents with a chief complaint of Hematoma (24 Oct 2022 11:22)      SUBJECTIVE / OVERNIGHT EVENTS: NAEON. Pt denies bleeding from wound or other sources.    ADDITIONAL REVIEW OF SYSTEMS: Denies new symptoms       PHYSICAL EXAM:  Vital Signs Last 24 Hrs  T(C): 36.6 (24 Oct 2022 11:00), Max: 36.8 (24 Oct 2022 05:50)  T(F): 97.9 (24 Oct 2022 11:00), Max: 98.2 (24 Oct 2022 05:50)  HR: 48 (24 Oct 2022 11:00) (42 - 48)  BP: 127/46 (24 Oct 2022 11:00) (100/43 - 134/56)  BP(mean): --  RR: 18 (24 Oct 2022 11:00) (18 - 19)  SpO2: 99% (24 Oct 2022 11:00) (99% - 100%)    Parameters below as of 24 Oct 2022 11:00  Patient On (Oxygen Delivery Method): room air      GENERAL: No acute distress, well-developed  HEAD:  Atraumatic, Normocephalic  EYES: EOM grossly intact,  conjunctiva and sclera clear  CHEST/LUNG: CTAB; No wheezes, rales, or rhonchi  HEART: Regular rate and rhythm; No murmurs, rubs, or gallops  ABDOMEN: Soft, non-tender, non-distended; normal bowel sounds, no organomegaly  EXTREMITIES: LLE bandaged  NEUROLOGY: A&O x 3, no focal deficits  SKIN: No rashes or lesions      MEDICATIONS  (STANDING):  ALPRAZolam 0.25 milliGRAM(s) Oral two times a day  atorvastatin 80 milliGRAM(s) Oral at bedtime  brimonidine 0.2% Ophthalmic Solution 1 Drop(s) Both EYES three times a day  dorzolamide 2% Ophthalmic Solution 1 Drop(s) Both EYES three times a day  gabapentin 100 milliGRAM(s) Oral three times a day  ketorolac 0.5% Ophthalmic Solution 1 Drop(s) Both EYES at bedtime  levothyroxine 50 MICROGram(s) Oral daily  melatonin 3 milliGRAM(s) Oral at bedtime  multivitamin 1 Tablet(s) Oral daily  predniSONE   Tablet 5 milliGRAM(s) Oral daily  senna 2 Tablet(s) Oral at bedtime    MEDICATIONS  (PRN):  acetaminophen     Tablet .. 650 milliGRAM(s) Oral every 6 hours PRN Temp greater or equal to 38C (100.4F), Mild Pain (1 - 3), Moderate Pain (4 - 6)        I&O's Summary        LABS:  CAPILLARY BLOOD GLUCOSE                              6.8    10.53 )-----------( 256      ( 24 Oct 2022 06:12 )             21.9     10-24    136  |  107  |  35<H>  ----------------------------<  119<H>  4.2   |  21<L>  |  0.96    Ca    8.9      24 Oct 2022 06:12  Phos  3.3     10-24  Mg     1.90     10-24                      RADIOLOGY & ADDITIONAL TESTS:      COORDINATION OF CARE:

## 2022-10-24 NOTE — PROGRESS NOTE ADULT - PROBLEM SELECTOR PLAN 1
Picture in Surgery Consult note 10/19/22  S/P Debridement with surgery  Wound care consulted  Surgery following  S/P 1 unit blood  HH (10/24)6.8-<7.3<-7.5<-7.7<7.2<9.3 (10/20)    Plan  -F/U WC recs, to see again tomorrow  -F/U Surgery recs  -Transfuse now, f/u post xfuse CBC, no bleeding at wound this a. Will d/w surgery today given hemoglobin drop  -Daily dressing changes  -  f/u outpatient w/ plastics for wound closure.  Trend HH

## 2022-10-24 NOTE — PROVIDER CONTACT NOTE (OTHER) - ASSESSMENT
Pt is resting comfortably in bed with no complaints. Bp is 100/43 and HR is 44. Pt denies chest pain, SOB, and shows no s/s of discomfort.
Pt alert but confused at times. No distress noted. Pt asymptomatic during episode of 8 beats of VTach. BP soft, HR 58 during episode. Pt denies chest pain, sob, palpitations, dizziness, HA. Frequent rounding. Call bell within reach. Safety maintained. Will continue to monitor.

## 2022-10-24 NOTE — PROVIDER CONTACT NOTE (OTHER) - ACTION/TREATMENT ORDERED:
Provider notified. No new interventions at this moment. Safety maintained. Will continue to monitor.
Continue monitoring.

## 2022-10-24 NOTE — PROGRESS NOTE ADULT - ASSESSMENT
87yoF w/ PMHx afib on eliquis, HLD, hypothyroid presents from NH after c/o LLE tightness and pain after a PT session.  LLE was found to have ruptured and bled.  Was seen by surgery and underwent debridement with 300-400cc clot evacuated.  Noted on telemetry and EKG that patient was bradycardic HR 30-40's.  As per patient, was told a long time ago (does not remember when) that her heart rate was slow.  Denies any chest pain, palpitations, lightheadedness, dizziness, SOB and syncope or near syncope.      Bradycardia   -EKG: Slow Afib VR 40's - remains asymptomatic   -Continue to monitor on telemetry  -Patient remains asymptomatic and hemodynamically stable   -Avoid AV damien blockers    87yoF w/ PMHx afib on Eliquis, HLD, hypothyroid presents from NH after c/o LLE tightness and pain after a PT session.  LLE was found to have ruptured and bled.  Was seen by surgery and underwent debridement with 300-400cc clot evacuated.  Noted on telemetry and EKG that patient was bradycardic HR 30-40's.  As per patient, was told a long time ago (does not remember when) that her heart rate was slow.  Denies any chest pain, palpitations, lightheadedness, dizziness, SOB and syncope or near syncope.  With activity heart rate does increase.  EKG: Slow Afib VR 40's - remains asymptomatic. Continue to monitor on telemetry. Avoid AV damien blockers

## 2022-10-24 NOTE — PROGRESS NOTE ADULT - ASSESSMENT
87F PMHx afib (eliquis), HLD, hypothyroid, bradycardia, leg + sacral ulcers presents from [place] with open bleeding wound.    Impression: Bleeding wound on Eliquis iso hx of leg ulcers and recent PT work

## 2022-10-25 DIAGNOSIS — H20.10 CHRONIC IRIDOCYCLITIS, UNSPECIFIED EYE: ICD-10-CM

## 2022-10-25 LAB
ANION GAP SERPL CALC-SCNC: 11 MMOL/L — SIGNIFICANT CHANGE UP (ref 7–14)
BUN SERPL-MCNC: 37 MG/DL — HIGH (ref 7–23)
CALCIUM SERPL-MCNC: 8.6 MG/DL — SIGNIFICANT CHANGE UP (ref 8.4–10.5)
CHLORIDE SERPL-SCNC: 107 MMOL/L — SIGNIFICANT CHANGE UP (ref 98–107)
CO2 SERPL-SCNC: 21 MMOL/L — LOW (ref 22–31)
CREAT SERPL-MCNC: 1.03 MG/DL — SIGNIFICANT CHANGE UP (ref 0.5–1.3)
EGFR: 53 ML/MIN/1.73M2 — LOW
GLUCOSE SERPL-MCNC: 102 MG/DL — HIGH (ref 70–99)
HCT VFR BLD CALC: 24.7 % — LOW (ref 34.5–45)
HGB BLD-MCNC: 7.7 G/DL — LOW (ref 11.5–15.5)
MAGNESIUM SERPL-MCNC: 1.9 MG/DL — SIGNIFICANT CHANGE UP (ref 1.6–2.6)
MCHC RBC-ENTMCNC: 27.3 PG — SIGNIFICANT CHANGE UP (ref 27–34)
MCHC RBC-ENTMCNC: 31.2 GM/DL — LOW (ref 32–36)
MCV RBC AUTO: 87.6 FL — SIGNIFICANT CHANGE UP (ref 80–100)
NRBC # BLD: 0 /100 WBCS — SIGNIFICANT CHANGE UP (ref 0–0)
NRBC # FLD: 0 K/UL — SIGNIFICANT CHANGE UP (ref 0–0)
PHOSPHATE SERPL-MCNC: 3.1 MG/DL — SIGNIFICANT CHANGE UP (ref 2.5–4.5)
PLATELET # BLD AUTO: 281 K/UL — SIGNIFICANT CHANGE UP (ref 150–400)
POTASSIUM SERPL-MCNC: 3.9 MMOL/L — SIGNIFICANT CHANGE UP (ref 3.5–5.3)
POTASSIUM SERPL-SCNC: 3.9 MMOL/L — SIGNIFICANT CHANGE UP (ref 3.5–5.3)
RBC # BLD: 2.82 M/UL — LOW (ref 3.8–5.2)
RBC # FLD: 15.9 % — HIGH (ref 10.3–14.5)
SARS-COV-2 RNA SPEC QL NAA+PROBE: SIGNIFICANT CHANGE UP
SODIUM SERPL-SCNC: 139 MMOL/L — SIGNIFICANT CHANGE UP (ref 135–145)
WBC # BLD: 9.42 K/UL — SIGNIFICANT CHANGE UP (ref 3.8–10.5)
WBC # FLD AUTO: 9.42 K/UL — SIGNIFICANT CHANGE UP (ref 3.8–10.5)

## 2022-10-25 PROCEDURE — 99233 SBSQ HOSP IP/OBS HIGH 50: CPT | Mod: GC

## 2022-10-25 PROCEDURE — 99231 SBSQ HOSP IP/OBS SF/LOW 25: CPT

## 2022-10-25 PROCEDURE — 99232 SBSQ HOSP IP/OBS MODERATE 35: CPT

## 2022-10-25 RX ADMIN — BRIMONIDINE TARTRATE 1 DROP(S): 2 SOLUTION/ DROPS OPHTHALMIC at 13:26

## 2022-10-25 RX ADMIN — GABAPENTIN 100 MILLIGRAM(S): 400 CAPSULE ORAL at 22:21

## 2022-10-25 RX ADMIN — ATORVASTATIN CALCIUM 80 MILLIGRAM(S): 80 TABLET, FILM COATED ORAL at 22:21

## 2022-10-25 RX ADMIN — DORZOLAMIDE HYDROCHLORIDE 1 DROP(S): 20 SOLUTION/ DROPS OPHTHALMIC at 13:26

## 2022-10-25 RX ADMIN — Medication 0.25 MILLIGRAM(S): at 18:27

## 2022-10-25 RX ADMIN — Medication 50 MICROGRAM(S): at 06:02

## 2022-10-25 RX ADMIN — Medication 5 MILLIGRAM(S): at 06:03

## 2022-10-25 RX ADMIN — GABAPENTIN 100 MILLIGRAM(S): 400 CAPSULE ORAL at 06:03

## 2022-10-25 RX ADMIN — Medication 1 TABLET(S): at 12:54

## 2022-10-25 RX ADMIN — DORZOLAMIDE HYDROCHLORIDE 1 DROP(S): 20 SOLUTION/ DROPS OPHTHALMIC at 06:01

## 2022-10-25 RX ADMIN — Medication 0.25 MILLIGRAM(S): at 07:15

## 2022-10-25 RX ADMIN — BRIMONIDINE TARTRATE 1 DROP(S): 2 SOLUTION/ DROPS OPHTHALMIC at 22:25

## 2022-10-25 RX ADMIN — DORZOLAMIDE HYDROCHLORIDE 1 DROP(S): 20 SOLUTION/ DROPS OPHTHALMIC at 22:26

## 2022-10-25 RX ADMIN — Medication 1 DROP(S): at 22:26

## 2022-10-25 RX ADMIN — GABAPENTIN 100 MILLIGRAM(S): 400 CAPSULE ORAL at 13:26

## 2022-10-25 RX ADMIN — SENNA PLUS 2 TABLET(S): 8.6 TABLET ORAL at 22:21

## 2022-10-25 RX ADMIN — BRIMONIDINE TARTRATE 1 DROP(S): 2 SOLUTION/ DROPS OPHTHALMIC at 06:07

## 2022-10-25 RX ADMIN — Medication 3 MILLIGRAM(S): at 22:21

## 2022-10-25 NOTE — PROGRESS NOTE ADULT - ATTENDING COMMENTS
Patient seen and examined at bedside. 86 y/o F with history of atrial fibrillation (on eliquis), HLD, hypothyroidism, bradycardia, leg and sacral wounds presenting from [facility] due to leg wound. Found to have LLE hematoma in setting of recent injury now s/p debridement by surgery on 10/19.    #LLE Wound/Hematoma  - now s/p beside debridement 10/19   - low concern for compartment syndrome as per surgery   - wound care eval noted, can be continued at [place]  - plastics consulted - outpatient follow up with Dr. Valdez for wound closure,   - monitor hgb, last transfusion was  on 10/24, transfused 1 unit, hgb now stable   - wound with serosanguinous dc, but not c/f new hematoma per wound care   - monitor h/h   - Per plastics, plan for wound vac     #Afib w/ Slow VR,   - holding Eliquis d/t hematoma   - monitor HR on tele  - will follow up with surgery Re: when safe to re-start AC.   - Pt CHADSVASC 3 - age and female. Patient at this time expressing that she would prefer to remain off the AC. Revisit with patient prior to dc   Dispo; [facility] with wound vac likely 10/26 35 minutes spent discharge planning.   Discussed with HS1 .

## 2022-10-25 NOTE — PROGRESS NOTE ADULT - SUBJECTIVE AND OBJECTIVE BOX
Interval History:  No acute events overnight   Telemetry: Afib as low as VR 35bpm when at rest, VR 40-50's while getting bath - patient remains asymptomatic     MEDICATIONS  (STANDING):  ALPRAZolam 0.25 milliGRAM(s) Oral two times a day  atorvastatin 80 milliGRAM(s) Oral at bedtime  brimonidine 0.2% Ophthalmic Solution 1 Drop(s) Both EYES three times a day  dorzolamide 2% Ophthalmic Solution 1 Drop(s) Both EYES three times a day  gabapentin 100 milliGRAM(s) Oral three times a day  ketorolac 0.5% Ophthalmic Solution 1 Drop(s) Both EYES at bedtime  levothyroxine 50 MICROGram(s) Oral daily  melatonin 3 milliGRAM(s) Oral at bedtime  multivitamin 1 Tablet(s) Oral daily  predniSONE   Tablet 5 milliGRAM(s) Oral daily  senna 2 Tablet(s) Oral at bedtime    MEDICATIONS  (PRN):  acetaminophen     Tablet .. 650 milliGRAM(s) Oral every 6 hours PRN Temp greater or equal to 38C (100.4F), Mild Pain (1 - 3), Moderate Pain (4 - 6)    Vital Signs Last 24 Hrs  T(C): 36.7 (10-25-22 @ 05:54), Max: 36.7 (10-25-22 @ 05:54)  T(F): 98 (10-25-22 @ 05:54), Max: 98 (10-25-22 @ 05:54)  HR: 45 (10-25-22 @ 05:54) (44 - 48)  BP: 129/49 (10-25-22 @ 05:54) (121/51 - 129/49)  BP(mean): --  RR: 17 (10-25-22 @ 05:54) (17 - 18)  SpO2: 99% (10-25-22 @ 05:54) (95% - 99%)    Appearance: Normal	  HEENT:   Normal oral mucosa, PERRL, EOMI	  Lymphatic: No lymphadenopathy  Cardiovascular: No JVD, No murmurs, No edema  Respiratory: Lungs clear to auscultation	  Psychiatry: A & O x 3, Mood & affect appropriate  Gastrointestinal:  Soft, Non-tender, + BS	  Skin: No rashes, No ecchymoses, No cyanosis	  Neurologic: Non-focal  Extremities: Normal range of motion, No clubbing, cyanosis or edema  Vascular: Peripheral pulses palpable 2+ bilaterally    LABS:	 	    CBC Full  -  ( 25 Oct 2022 06:20 )  WBC Count : 9.42 K/uL  Hemoglobin : 7.7 g/dL  Hematocrit : 24.7 %  Platelet Count - Automated : 281 K/uL  Mean Cell Volume : 87.6 fL  Mean Cell Hemoglobin : 27.3 pg  Mean Cell Hemoglobin Concentration : 31.2 gm/dL  Auto Neutrophil # : x  Auto Lymphocyte # : x  Auto Monocyte # : x  Auto Eosinophil # : x  Auto Basophil # : x  Auto Neutrophil % : x  Auto Lymphocyte % : x  Auto Monocyte % : x  Auto Eosinophil % : x  Auto Basophil % : x    10-25    139  |  107  |  37<H>  ----------------------------<  102<H>  3.9   |  21<L>  |  1.03  10-24    136  |  107  |  35<H>  ----------------------------<  119<H>  4.2   |  21<L>  |  0.96    Ca    8.6      25 Oct 2022 06:20  Ca    8.9      24 Oct 2022 06:12  Phos  3.1     10-25  Phos  3.3     10-24  Mg     1.90     10-25  Mg     1.90     10-24

## 2022-10-25 NOTE — PROGRESS NOTE ADULT - ASSESSMENT
87F PMHx afib (eliquis), HLD, hypothyroid, bradycardia, leg + sacral ulcers presents from [place] with open bleeding wound.    Impression: Bleeding wound on Eliquis iso hx of leg ulcers and recent PT work

## 2022-10-25 NOTE — PROGRESS NOTE ADULT - PROBLEM SELECTOR PLAN 6
-continue home meds including oral prednisone 5mg QD, Ketorolax .5% opthalmic qHS, Brimonidine 0.2% TID  -Pt takes Brinzolamide at home, therapuetic exchange for dorzolamide 2% TID  Pt not content with therapeutic exchange, counseled to have family bring in Brinzolamide

## 2022-10-25 NOTE — PROGRESS NOTE ADULT - NS ATTEND AMEND GEN_ALL_CORE FT
87yoF w/ PMHx afib on Eliquis, HLD, hypothyroid presents from NH after c/o LLE tightness and pain after a PT session.  LLE was found to have ruptured and bled.  Was seen by surgery and underwent debridement with 300-400cc clot evacuated.  Noted on telemetry and EKG that patient was bradycardic HR 30-40's.  As per patient, was told a long time ago (does not remember when) that her heart rate was slow.  Denies any chest pain, palpitations, lightheadedness, dizziness, SOB and syncope or near syncope.  With activity heart rate does increase.  EKG: Slow Afib VR 40's - remains asymptomatic.

## 2022-10-25 NOTE — PROGRESS NOTE ADULT - ASSESSMENT
Assessment/Plan: 87F PMHx afib (eliquis), HLD, hypothyroid, bradycardia, leg + sacral ulcers presents from [facility] with open bleeding wound s/p bedside debridement by general surgery of hematoma. S/p 2unit PRBC.    Wound Consult requested to assist w/ management of LLE open wound and sacral stage 4 pressure injury.    Left anterior lower leg open wound s/p debridement of hematoma:  - Wound base clean, red-pink moist viable, scattered purple-maroon discoloration no bone exposed.  - No active bleeding; hemostasis remained.  - epidermal-dermal flaps at 3 and 9 o'clock macerated and dusky, nonviable  - Periwound skin with dry-flaky skin. (+) ecchymosis from 10-12 o'clock.   - CT angio lower extremities reviewed; differ findings to primary team, may consider obtaining litzy/pvr, doppler studies.  - Topical Recommendations: gently cleanse with NS, Pat dry. Apply Liquid barrier film to periwound skin. Apply xeroform to wound base, cover with 4x4 gauze and abdominal pads. Once dressing is in place apply sween 24 moisturizer to intact skin of leg and foot, avoid between toes. Secure with kerlix wrap and ace bandage. Change daily.  - Right leg apply sween 24 daily  - Continue to offload heels with complete cair boots.  - Per discussion with Dr. Bland, discussion with plastic surgery and primary team took place. Plastic surgery recommending NPWT/VAC placement. Per our assessment today; although I am not opposed to NPWT/VAC treatement there is not dead space to fill. Patient's AC is currently being held. Unsure of benefit VAC would have. Topical recommendations remain the same as above. However, if decision is made amongst primary team and plastic surgery to apply NPWT/VAC therapy, consider use of silicone contact layer to base for atraumatic dressing removal, and initiate at lower suction  mmHg in setting of anemia and plan to restart AC. If chris red blood is noted in VAC canister, discontinue VAC therapy, obtain hemostasis, and continue with recommended dressing above. Follow up with outpatient Plastic Surgery MD as instructed.    Sacral stage 4 pressure injury  - wound base clean, no bone exposed. Stable.  - small serous drainage.  - no associated cellulitis  - CT no obvious bony erosions, consider MRI to r/o osteomyelitis if wound is not improving. No clinical signs of osteomyelitis at this times.  - Topical recommendations: cleanse with NS. Pat dry. Apply Liquid barrier film to periwound skin. Apply Aquacel hydrofiber to wound base, cover with silicone foam with border. Change daily and prn with episodes of incontinence.  -Continue w/ low air loss fluidized bed surface   - Continue turning and positioning w/ offloading assistive devices as per protocol  - Continue w/ Pericare as per protocol, continue use of single breathable incontinence pad. Educated patient on avoiding use of diapers in setting of full thickness pressure injury. Emotional support provided to patient; reviewed purpose of external female urinary collection device and use of single breathable incontinence pad. Patient verbalized understanding and agreed to avoid diaper usage for now.  - Waffle Cushion to chair when oob to chair      Nutrition Consult for optimization as tolerated consider MVI & Vit C to promote wound healing encourage high quality protein when appropriate.    Upon discharge f/u as outpatient at [facility] [address] [place], [place] [phone]  Seen w/ Dr. Colindres.  Findings and plan discussed with patient and primary team Dr. Bland.  All questions and concerns addressed to meet patient's satisfaction.    Will continue to follow periodically while inpatient, please reconsult earlier if needed.    Thank you for this consult  DAWIT Navarrete, CWADRIELN (pager #[phone])    If after 4PM or before 7:30AM on Mon-Friday or weekend/holiday please contact general surgery for urgent matters.   Team A- [id number]   Team B- [id number]  For non-urgent matters e-mail [email or web address]    We spent 35 minutes face to face with this patient of which more than 50% of the time was spent counseling & coordinating care of this pt

## 2022-10-25 NOTE — PROGRESS NOTE ADULT - SUBJECTIVE AND OBJECTIVE BOX
[facility]-- WOUND TEAM -- FOLLOW UP NOTE  --------------------------------------------------------------------------------    subjective: Resting in bed comfortably, appears in NAD. Asking "is my leg still bleeding, will I need surgery on my leg." Patient endorses pain to Left knee and to wound with movement/elevation, unable to quantify pain. Denies SOB, CP, n/v, fever, chills.     Interval HPI/24 hour events:   Remains off AC  Drop in hgb to 6.8 on 10/24 s/p 1 unit PRBC, now Hgb 7.7  No active bleeding reporting from wound.  Plan to d/c to [facility].    Chart reviewed including labs and relevant images      Diet:  Diet, Regular:   Supplement Feeding Modality:  Oral  Ensure Plus High Protein Cans or Servings Per Day:  1       Frequency:  Daily (10-22-22 @ 17:09)      ROS: General, skin see above  All other system negative.    ALLERGIES & MEDICATIONS  --------------------------------------------------------------------------------  Allergies    penicillin (Unknown)    Intolerances          STANDING INPATIENT MEDICATIONS    ALPRAZolam 0.25 milliGRAM(s) Oral two times a day  atorvastatin 80 milliGRAM(s) Oral at bedtime  brimonidine 0.2% Ophthalmic Solution 1 Drop(s) Both EYES three times a day  dorzolamide 2% Ophthalmic Solution 1 Drop(s) Both EYES three times a day  gabapentin 100 milliGRAM(s) Oral three times a day  ketorolac 0.5% Ophthalmic Solution 1 Drop(s) Both EYES at bedtime  levothyroxine 50 MICROGram(s) Oral daily  melatonin 3 milliGRAM(s) Oral at bedtime  multivitamin 1 Tablet(s) Oral daily  predniSONE   Tablet 5 milliGRAM(s) Oral daily  senna 2 Tablet(s) Oral at bedtime      PRN INPATIENT MEDICATION  acetaminophen     Tablet .. 650 milliGRAM(s) Oral every 6 hours PRN        Vital signs:  T(C): 36.7 (10-25-22 @ 13:33), Max: 36.7 (10-25-22 @ 05:54)  HR: 46 (10-25-22 @ 13:33) (45 - 46)  BP: 127/56 (10-25-22 @ 13:33) (121/51 - 129/49)  RR: 18 (10-25-22 @ 13:33) (17 - 18)  SpO2: 97% (10-25-22 @ 13:33) (95% - 99%)  Wt(kg): 86.7 kg (Oct-20-22)      Constitutional: NAD, A&O x 4, poor historian at times, frail.  (+) low airloss support surface, (+) fluidized positioning devices, single breathable incontinence pad in place, heels offloaded   HEENT:  NC/AT, PERRL, mucosa moist  Cardiovascular: Bradycardic  Respiratory: nonlabored, equal chest expansion, room air  Gastrointestinal: soft NT/ND, incontinent of stool.  : external female urinary collection device in place  Neurology: strength & sensation grossly intact  Musculoskeletal: limited ROM b/l le, requires 2 person assist for t&P, no contractures or deformities.  Vascular: Dry -flaky skin of b/l le. Evidence of healed ulceration to right medial lower leg.  BLE equally warm. +2 dp pulse bilaterally, capillary refill < 3 seconds bilaterally.  Skin: frail, scattered ecchymosis without hematoma to bilateral upper extremities  Left anterior lower leg hematoma s/p debridement- removed dressing c/d/i- 18cmx5.8cmx0.3cm Wound base 100% pink-moist viable with scattered purple-maroon discoloration. No active bleeding noted, hemostasis remains. Skin flaps with maceration, dusky, nonviable. Periwound skin with ecchymosis. Increased warmth resolved. Cleansed with NS, liquid barrier film applied to periwound skin and skin flaps, xeroform to wound base, covered with abdominal pads, kerlix and ace wrap.  Sacrum- stage 4 pressure injury - patient turned to right side- 3mxm1mbl2.4cm- pink-moist agranular base. Small serous drainage, no odor. Periwound skin intact. No associated cellulitis. Aquacel/foam applied.  Left trochanter- mild soft tissue defect/scar tissue, no open ulceration noted, evidence of healed skin impairment.   Psych: calm, cooperative.      LABS/ CULTURES/ RADIOLOGY:              7.7    9.42  >-----------<  281      [10-25-22 @ 06:20]              24.7     139  |  107  |  37  ----------------------------<  102      [10-25-22 @ 06:20]  3.9   |  21  |  1.03        Ca     8.6     [10-25-22 @ 06:20]      Mg     1.90     [10-25-22 @ 06:20]      Phos  3.1     [10-25-22 @ 06:20]        Culture - Blood (collected 10-19-22 @ 12:42)  Source: .Blood Blood  Final Report (10-24-22 @ 15:00):    No Growth Final    Culture - Blood (collected 10-19-22 @ 12:20)  Source: .Blood Blood  Final Report (10-24-22 @ 15:00):    No Growth Final

## 2022-10-25 NOTE — PROGRESS NOTE ADULT - SUBJECTIVE AND OBJECTIVE BOX
PROGRESS NOTE:       Patient is a 87y old  Female who presents with a chief complaint of Hematoma (25 Oct 2022 10:29)      SUBJECTIVE / OVERNIGHT EVENTS: NAEON. Last BM yesterday    ADDITIONAL REVIEW OF SYSTEMS: Denies new symptoms      PHYSICAL EXAM:  Vital Signs Last 24 Hrs  T(C): 36.7 (25 Oct 2022 13:33), Max: 36.7 (25 Oct 2022 05:54)  T(F): 98.1 (25 Oct 2022 13:33), Max: 98.1 (25 Oct 2022 13:33)  HR: 46 (25 Oct 2022 13:33) (44 - 46)  BP: 127/56 (25 Oct 2022 13:33) (121/51 - 129/49)  BP(mean): --  RR: 18 (25 Oct 2022 13:33) (17 - 18)  SpO2: 97% (25 Oct 2022 13:33) (95% - 99%)    Parameters below as of 25 Oct 2022 13:33  Patient On (Oxygen Delivery Method): room air    GENERAL: No acute distress, well-developed  HEAD:  Atraumatic, Normocephalic  EYES: EOM grossly intact,  conjunctiva and sclera clear  CHEST/LUNG: CTAB; No wheezes, rales, or rhonchi  HEART: Slow rate and rhythm; No murmurs, rubs, or gallops  ABDOMEN: Soft, non-tender, non-distended; normal bowel sounds, no organomegaly  EXTREMITIES: LLE bandaged  NEUROLOGY: A&O x 3, no focal deficits  SKIN: No rashes or lesions    MEDICATIONS  (STANDING):  ALPRAZolam 0.25 milliGRAM(s) Oral two times a day  atorvastatin 80 milliGRAM(s) Oral at bedtime  brimonidine 0.2% Ophthalmic Solution 1 Drop(s) Both EYES three times a day  dorzolamide 2% Ophthalmic Solution 1 Drop(s) Both EYES three times a day  gabapentin 100 milliGRAM(s) Oral three times a day  ketorolac 0.5% Ophthalmic Solution 1 Drop(s) Both EYES at bedtime  levothyroxine 50 MICROGram(s) Oral daily  melatonin 3 milliGRAM(s) Oral at bedtime  multivitamin 1 Tablet(s) Oral daily  predniSONE   Tablet 5 milliGRAM(s) Oral daily  senna 2 Tablet(s) Oral at bedtime    MEDICATIONS  (PRN):  acetaminophen     Tablet .. 650 milliGRAM(s) Oral every 6 hours PRN Temp greater or equal to 38C (100.4F), Mild Pain (1 - 3), Moderate Pain (4 - 6)        I&O's Summary        LABS:  CAPILLARY BLOOD GLUCOSE                              7.7    9.42  )-----------( 281      ( 25 Oct 2022 06:20 )             24.7     10-25    139  |  107  |  37<H>  ----------------------------<  102<H>  3.9   |  21<L>  |  1.03    Ca    8.6      25 Oct 2022 06:20  Phos  3.1     10-25  Mg     1.90     10-25                      RADIOLOGY & ADDITIONAL TESTS:      COORDINATION OF CARE:

## 2022-10-25 NOTE — PROGRESS NOTE ADULT - ASSESSMENT
87yoF w/ PMHx afib on Eliquis, HLD, hypothyroid presents from [facility] after c/o LLE tightness and pain after a PT session.  LLE was found to have ruptured and bled.  Was seen by surgery and underwent debridement with 300-400cc clot evacuated.  Noted on telemetry and EKG that patient was bradycardic HR 30-40's.  As per patient, was told a long time ago (does not remember when) that her heart rate was slow.  Denies any chest pain, palpitations, lightheadedness, dizziness, SOB and syncope or near syncope.      With activity heart rate does increase.    EKG: Slow Afib VR 40's - remains asymptomatic.   Continue to monitor on telemetry.   Avoid AV damien blockers   No pacing indications at this time

## 2022-10-25 NOTE — PROGRESS NOTE ADULT - PROBLEM SELECTOR PLAN 1
Picture in Surgery Consult note 10/19/22  S/P Debridement with surgery  Wound care consulted  Surgery signed off  S/P 2 units blood  HH (10/25)7.7<-7.5<-Xfuse<-6.8-<7.3<-7.5<-7.7<7.2<9.3 (10/20)    Plan  - rec plastic assess now, Dr. Wick called. Message left with   -Daily dressing changes  -Trend HH

## 2022-10-26 LAB
ANION GAP SERPL CALC-SCNC: 11 MMOL/L — SIGNIFICANT CHANGE UP (ref 7–14)
BUN SERPL-MCNC: 34 MG/DL — HIGH (ref 7–23)
CALCIUM SERPL-MCNC: 8.9 MG/DL — SIGNIFICANT CHANGE UP (ref 8.4–10.5)
CHLORIDE SERPL-SCNC: 107 MMOL/L — SIGNIFICANT CHANGE UP (ref 98–107)
CO2 SERPL-SCNC: 19 MMOL/L — LOW (ref 22–31)
CREAT SERPL-MCNC: 0.86 MG/DL — SIGNIFICANT CHANGE UP (ref 0.5–1.3)
EGFR: 66 ML/MIN/1.73M2 — SIGNIFICANT CHANGE UP
GLUCOSE SERPL-MCNC: 92 MG/DL — SIGNIFICANT CHANGE UP (ref 70–99)
HCT VFR BLD CALC: 25.3 % — LOW (ref 34.5–45)
HGB BLD-MCNC: 8 G/DL — LOW (ref 11.5–15.5)
MAGNESIUM SERPL-MCNC: 1.9 MG/DL — SIGNIFICANT CHANGE UP (ref 1.6–2.6)
MCHC RBC-ENTMCNC: 27.4 PG — SIGNIFICANT CHANGE UP (ref 27–34)
MCHC RBC-ENTMCNC: 31.6 GM/DL — LOW (ref 32–36)
MCV RBC AUTO: 86.6 FL — SIGNIFICANT CHANGE UP (ref 80–100)
NRBC # BLD: 0 /100 WBCS — SIGNIFICANT CHANGE UP (ref 0–0)
NRBC # FLD: 0 K/UL — SIGNIFICANT CHANGE UP (ref 0–0)
PHOSPHATE SERPL-MCNC: 3 MG/DL — SIGNIFICANT CHANGE UP (ref 2.5–4.5)
PLATELET # BLD AUTO: 342 K/UL — SIGNIFICANT CHANGE UP (ref 150–400)
POTASSIUM SERPL-MCNC: 3.7 MMOL/L — SIGNIFICANT CHANGE UP (ref 3.5–5.3)
POTASSIUM SERPL-SCNC: 3.7 MMOL/L — SIGNIFICANT CHANGE UP (ref 3.5–5.3)
RBC # BLD: 2.92 M/UL — LOW (ref 3.8–5.2)
RBC # FLD: 15.9 % — HIGH (ref 10.3–14.5)
SODIUM SERPL-SCNC: 137 MMOL/L — SIGNIFICANT CHANGE UP (ref 135–145)
WBC # BLD: 8.89 K/UL — SIGNIFICANT CHANGE UP (ref 3.8–10.5)
WBC # FLD AUTO: 8.89 K/UL — SIGNIFICANT CHANGE UP (ref 3.8–10.5)

## 2022-10-26 PROCEDURE — 99232 SBSQ HOSP IP/OBS MODERATE 35: CPT | Mod: GC

## 2022-10-26 RX ORDER — APIXABAN 2.5 MG/1
2.5 TABLET, FILM COATED ORAL
Refills: 0 | Status: DISCONTINUED | OUTPATIENT
Start: 2022-10-26 | End: 2022-10-28

## 2022-10-26 RX ORDER — APIXABAN 2.5 MG/1
1 TABLET, FILM COATED ORAL
Qty: 0 | Refills: 0 | DISCHARGE
Start: 2022-10-26

## 2022-10-26 RX ORDER — APIXABAN 2.5 MG/1
1 TABLET, FILM COATED ORAL
Qty: 0 | Refills: 0 | DISCHARGE

## 2022-10-26 RX ADMIN — BRIMONIDINE TARTRATE 1 DROP(S): 2 SOLUTION/ DROPS OPHTHALMIC at 13:21

## 2022-10-26 RX ADMIN — DORZOLAMIDE HYDROCHLORIDE 1 DROP(S): 20 SOLUTION/ DROPS OPHTHALMIC at 21:22

## 2022-10-26 RX ADMIN — Medication 50 MICROGRAM(S): at 07:16

## 2022-10-26 RX ADMIN — GABAPENTIN 100 MILLIGRAM(S): 400 CAPSULE ORAL at 13:18

## 2022-10-26 RX ADMIN — DORZOLAMIDE HYDROCHLORIDE 1 DROP(S): 20 SOLUTION/ DROPS OPHTHALMIC at 13:21

## 2022-10-26 RX ADMIN — BRIMONIDINE TARTRATE 1 DROP(S): 2 SOLUTION/ DROPS OPHTHALMIC at 21:22

## 2022-10-26 RX ADMIN — GABAPENTIN 100 MILLIGRAM(S): 400 CAPSULE ORAL at 07:16

## 2022-10-26 RX ADMIN — BRIMONIDINE TARTRATE 1 DROP(S): 2 SOLUTION/ DROPS OPHTHALMIC at 07:15

## 2022-10-26 RX ADMIN — Medication 1 TABLET(S): at 13:18

## 2022-10-26 RX ADMIN — GABAPENTIN 100 MILLIGRAM(S): 400 CAPSULE ORAL at 21:18

## 2022-10-26 RX ADMIN — Medication 5 MILLIGRAM(S): at 07:16

## 2022-10-26 RX ADMIN — Medication 3 MILLIGRAM(S): at 21:18

## 2022-10-26 RX ADMIN — APIXABAN 2.5 MILLIGRAM(S): 2.5 TABLET, FILM COATED ORAL at 18:53

## 2022-10-26 RX ADMIN — ATORVASTATIN CALCIUM 80 MILLIGRAM(S): 80 TABLET, FILM COATED ORAL at 21:19

## 2022-10-26 RX ADMIN — Medication 0.25 MILLIGRAM(S): at 07:16

## 2022-10-26 RX ADMIN — Medication 0.25 MILLIGRAM(S): at 21:18

## 2022-10-26 RX ADMIN — DORZOLAMIDE HYDROCHLORIDE 1 DROP(S): 20 SOLUTION/ DROPS OPHTHALMIC at 07:15

## 2022-10-26 RX ADMIN — Medication 650 MILLIGRAM(S): at 13:17

## 2022-10-26 RX ADMIN — Medication 1 DROP(S): at 21:18

## 2022-10-26 NOTE — PROGRESS NOTE ADULT - ASSESSMENT
87F PMHx afib (eliquis), HLD, hypothyroid, bradycardia, leg + sacral ulcers presents from [facility] with open bleeding wound.    Impression: Bleeding wound on Eliquis iso hx of leg ulcers and recent PT work

## 2022-10-26 NOTE — PROGRESS NOTE ADULT - SUBJECTIVE AND OBJECTIVE BOX
PROGRESS NOTE:       Patient is a 87y old  Female who presents with a chief complaint of Hematoma (26 Oct 2022 07:46)      SUBJECTIVE / OVERNIGHT EVENTS: NAEON. Denies new symptoms. Denies pain.  Denies bleeding    ADDITIONAL REVIEW OF SYSTEMS: as aboive Limited, pt does not want to answer questions      PHYSICAL EXAM:  Vital Signs Last 24 Hrs  T(C): 36.3 (26 Oct 2022 07:15), Max: 36.7 (25 Oct 2022 13:33)  T(F): 97.4 (26 Oct 2022 07:15), Max: 98.1 (25 Oct 2022 13:33)  HR: 40 (26 Oct 2022 07:15) (40 - 46)  BP: 147/56 (26 Oct 2022 07:15) (127/56 - 150/60)  BP(mean): --  RR: 18 (26 Oct 2022 07:15) (18 - 18)  SpO2: 99% (26 Oct 2022 07:15) (97% - 99%)    Parameters below as of 26 Oct 2022 07:15  Patient On (Oxygen Delivery Method): room air      GENERAL: No acute distress, well-developed  HEAD:  Atraumatic, Normocephalic  EYES: EOM grossly intact,  conjunctiva and sclera clear, PERRL  CHEST/LUNG: CTAB; No wheezes, rales, or rhonchi  HEART: Slow rate and seemingly regular rhythm; No murmurs, rubs, or gallops  ABDOMEN: Soft, non-tender, non-distended; normal bowel sounds, no organomegaly  EXTREMITIES:  No clubbing, cyanosis, or edema. LLE bandaged   NEUROLOGY: A&O x 3, no focal deficits  SKIN: No rashes or lesions      MEDICATIONS  (STANDING):  ALPRAZolam 0.25 milliGRAM(s) Oral two times a day  atorvastatin 80 milliGRAM(s) Oral at bedtime  brimonidine 0.2% Ophthalmic Solution 1 Drop(s) Both EYES three times a day  dorzolamide 2% Ophthalmic Solution 1 Drop(s) Both EYES three times a day  gabapentin 100 milliGRAM(s) Oral three times a day  ketorolac 0.5% Ophthalmic Solution 1 Drop(s) Both EYES at bedtime  levothyroxine 50 MICROGram(s) Oral daily  melatonin 3 milliGRAM(s) Oral at bedtime  multivitamin 1 Tablet(s) Oral daily  predniSONE   Tablet 5 milliGRAM(s) Oral daily  senna 2 Tablet(s) Oral at bedtime    MEDICATIONS  (PRN):  acetaminophen     Tablet .. 650 milliGRAM(s) Oral every 6 hours PRN Temp greater or equal to 38C (100.4F), Mild Pain (1 - 3), Moderate Pain (4 - 6)        I&O's Summary        LABS:  CAPILLARY BLOOD GLUCOSE                              8.0    8.89  )-----------( 342      ( 26 Oct 2022 05:30 )             25.3     10-26    137  |  107  |  34<H>  ----------------------------<  92  3.7   |  19<L>  |  0.86    Ca    8.9      26 Oct 2022 05:30  Phos  3.0     10-26  Mg     1.90     10-26                      RADIOLOGY & ADDITIONAL TESTS:      COORDINATION OF CARE:

## 2022-10-26 NOTE — PROGRESS NOTE ADULT - PROBLEM SELECTOR PLAN 1
Picture in Surgery Consult note 10/19/22  S/P Debridement with surgery  Wound care consulted  Surgery signed off  S/P 2 units blood  HH (10/26)8.0<-7.7<-7.5<-Xfuse<-6.8-<7.3<-7.5<-7.7<7.2<9.3 (10/20)      Plan  -Plastic surgeon Dr. Wick called and shown pictures of initial and current wound. Rec vac to start at [place] and continued wound care for eventual plastics closure  -Daily dressing changes  -Trend HH.

## 2022-10-26 NOTE — PROGRESS NOTE ADULT - SUBJECTIVE AND OBJECTIVE BOX
Interval History:  No acute events overnight  Telemetry: Afib with VR 30's-40's - remains asymptomatic and hemodynamically stable    MEDICATIONS  (STANDING):  ALPRAZolam 0.25 milliGRAM(s) Oral two times a day  atorvastatin 80 milliGRAM(s) Oral at bedtime  brimonidine 0.2% Ophthalmic Solution 1 Drop(s) Both EYES three times a day  dorzolamide 2% Ophthalmic Solution 1 Drop(s) Both EYES three times a day  gabapentin 100 milliGRAM(s) Oral three times a day  ketorolac 0.5% Ophthalmic Solution 1 Drop(s) Both EYES at bedtime  levothyroxine 50 MICROGram(s) Oral daily  melatonin 3 milliGRAM(s) Oral at bedtime  multivitamin 1 Tablet(s) Oral daily  predniSONE   Tablet 5 milliGRAM(s) Oral daily  senna 2 Tablet(s) Oral at bedtime    MEDICATIONS  (PRN):  acetaminophen     Tablet .. 650 milliGRAM(s) Oral every 6 hours PRN Temp greater or equal to 38C (100.4F), Mild Pain (1 - 3), Moderate Pain (4 - 6)    Vital Signs Last 24 Hrs  T(C): 36.7 (10-25-22 @ 22:20), Max: 36.7 (10-25-22 @ 13:33)  T(F): 98.1 (10-25-22 @ 22:20), Max: 98.1 (10-25-22 @ 13:33)  HR: 44 (10-25-22 @ 22:20) (44 - 46)  BP: 150/60 (10-25-22 @ 22:20) (127/56 - 150/60)  BP(mean): --  RR: 18 (10-25-22 @ 22:20) (18 - 18)  SpO2: 98% (10-25-22 @ 22:20) (97% - 98%)    Appearance: Normal	  HEENT:   Normal oral mucosa, PERRL, EOMI	  Lymphatic: No lymphadenopathy  Cardiovascular: No JVD, No murmurs, No edema  Respiratory: Lungs clear to auscultation	  Psychiatry: A & O x 3, Mood & affect appropriate  Gastrointestinal:  Soft, Non-tender, + BS	  Skin: No rashes, No ecchymoses, No cyanosis	  Neurologic: Non-focal  Extremities: Normal range of motion, No clubbing, cyanosis or edema  Vascular: Peripheral pulses palpable 2+ bilaterally    LABS:	 	    CBC Full  -  ( 26 Oct 2022 05:30 )  WBC Count : 8.89 K/uL  Hemoglobin : 8.0 g/dL  Hematocrit : 25.3 %  Platelet Count - Automated : 342 K/uL  Mean Cell Volume : 86.6 fL  Mean Cell Hemoglobin : 27.4 pg  Mean Cell Hemoglobin Concentration : 31.6 gm/dL  Auto Neutrophil # : x  Auto Lymphocyte # : x  Auto Monocyte # : x  Auto Eosinophil # : x  Auto Basophil # : x  Auto Neutrophil % : x  Auto Lymphocyte % : x  Auto Monocyte % : x  Auto Eosinophil % : x  Auto Basophil % : x    10-26    137  |  107  |  34<H>  ----------------------------<  92  3.7   |  19<L>  |  0.86  10-25    139  |  107  |  37<H>  ----------------------------<  102<H>  3.9   |  21<L>  |  1.03    Ca    8.9      26 Oct 2022 05:30  Ca    8.6      25 Oct 2022 06:20  Phos  3.0     10-26  Phos  3.1     10-25  Mg     1.90     10-26  Mg     1.90     10-25

## 2022-10-26 NOTE — PROGRESS NOTE ADULT - ATTENDING COMMENTS
Patient seen and examined at bedside. 88 y/o F with history of atrial fibrillation (on eliquis), HLD, hypothyroidism, bradycardia, leg and sacral wounds presenting from [facility] due to leg wound. Found to have LLE hematoma in setting of recent injury now s/p debridement by surgery on 10/19. Re-starting AC on 10/26. Plan for wound vac at [facility]     #LLE Wound/Hematoma  - now s/p beside debridement 10/19   - low concern for compartment syndrome as per surgery   - wound care eval noted, can be continued at [facility]  - plastics consulted - outpatient follow up with Dr. Valdez for wound closure,   - monitor hgb, last transfusion was  on 10/24, transfused 1 unit, hgb now stable   - wound with serosanguinous dc, but not c/f new hematoma per wound care   - monitor h/h   - Per plastics, plan for wound vac - will arrange to be done at [facility]     #Afib w/ Slow VR,   - holding Eliquis d/t hematoma   - Can take off telemetry   - Pt has been off AC, for a week, will re-start low dose AC. While pt does not meet 2 out of 3 criteria, will reduce dose given hematoma. Started on 10/26   - Pt CHADSVASC 3 - age and female.  Dispo; [facility] with wound vac likely 10/26 35 minutes spent discharge planning.   Discussed with HS1 .

## 2022-10-27 LAB
HCT VFR BLD CALC: 27 % — LOW (ref 34.5–45)
HGB BLD-MCNC: 8.3 G/DL — LOW (ref 11.5–15.5)
MCHC RBC-ENTMCNC: 27.1 PG — SIGNIFICANT CHANGE UP (ref 27–34)
MCHC RBC-ENTMCNC: 30.7 GM/DL — LOW (ref 32–36)
MCV RBC AUTO: 88.2 FL — SIGNIFICANT CHANGE UP (ref 80–100)
NRBC # BLD: 0 /100 WBCS — SIGNIFICANT CHANGE UP (ref 0–0)
NRBC # FLD: 0 K/UL — SIGNIFICANT CHANGE UP (ref 0–0)
PLATELET # BLD AUTO: 399 K/UL — SIGNIFICANT CHANGE UP (ref 150–400)
RBC # BLD: 3.06 M/UL — LOW (ref 3.8–5.2)
RBC # FLD: 16.1 % — HIGH (ref 10.3–14.5)
WBC # BLD: 9.91 K/UL — SIGNIFICANT CHANGE UP (ref 3.8–10.5)
WBC # FLD AUTO: 9.91 K/UL — SIGNIFICANT CHANGE UP (ref 3.8–10.5)

## 2022-10-27 PROCEDURE — 99231 SBSQ HOSP IP/OBS SF/LOW 25: CPT

## 2022-10-27 RX ADMIN — GABAPENTIN 100 MILLIGRAM(S): 400 CAPSULE ORAL at 13:05

## 2022-10-27 RX ADMIN — Medication 1 TABLET(S): at 13:05

## 2022-10-27 RX ADMIN — Medication 1 DROP(S): at 21:39

## 2022-10-27 RX ADMIN — BRIMONIDINE TARTRATE 1 DROP(S): 2 SOLUTION/ DROPS OPHTHALMIC at 13:05

## 2022-10-27 RX ADMIN — GABAPENTIN 100 MILLIGRAM(S): 400 CAPSULE ORAL at 05:33

## 2022-10-27 RX ADMIN — Medication 3 MILLIGRAM(S): at 21:40

## 2022-10-27 RX ADMIN — ATORVASTATIN CALCIUM 80 MILLIGRAM(S): 80 TABLET, FILM COATED ORAL at 21:40

## 2022-10-27 RX ADMIN — BRIMONIDINE TARTRATE 1 DROP(S): 2 SOLUTION/ DROPS OPHTHALMIC at 21:38

## 2022-10-27 RX ADMIN — Medication 5 MILLIGRAM(S): at 05:33

## 2022-10-27 RX ADMIN — APIXABAN 2.5 MILLIGRAM(S): 2.5 TABLET, FILM COATED ORAL at 05:33

## 2022-10-27 RX ADMIN — APIXABAN 2.5 MILLIGRAM(S): 2.5 TABLET, FILM COATED ORAL at 18:18

## 2022-10-27 RX ADMIN — DORZOLAMIDE HYDROCHLORIDE 1 DROP(S): 20 SOLUTION/ DROPS OPHTHALMIC at 21:38

## 2022-10-27 RX ADMIN — GABAPENTIN 100 MILLIGRAM(S): 400 CAPSULE ORAL at 21:41

## 2022-10-27 RX ADMIN — BRIMONIDINE TARTRATE 1 DROP(S): 2 SOLUTION/ DROPS OPHTHALMIC at 05:33

## 2022-10-27 RX ADMIN — DORZOLAMIDE HYDROCHLORIDE 1 DROP(S): 20 SOLUTION/ DROPS OPHTHALMIC at 13:05

## 2022-10-27 RX ADMIN — Medication 50 MICROGRAM(S): at 05:33

## 2022-10-27 RX ADMIN — DORZOLAMIDE HYDROCHLORIDE 1 DROP(S): 20 SOLUTION/ DROPS OPHTHALMIC at 05:33

## 2022-10-27 NOTE — PROGRESS NOTE ADULT - SUBJECTIVE AND OBJECTIVE BOX
incomplete note PROGRESS NOTE:       Patient is a 87y old  Female who presents with a chief complaint of Hematoma (27 Oct 2022 15:30)      SUBJECTIVE / OVERNIGHT EVENTS:    ADDITIONAL REVIEW OF SYSTEMS:      PHYSICAL EXAM:  Vital Signs Last 24 Hrs  T(C): 36.6 (27 Oct 2022 05:30), Max: 36.6 (26 Oct 2022 21:00)  T(F): 97.9 (27 Oct 2022 05:30), Max: 97.9 (26 Oct 2022 21:00)  HR: 47 (27 Oct 2022 05:30) (47 - 49)  BP: 154/62 (27 Oct 2022 05:30) (154/62 - 158/60)  BP(mean): --  RR: 17 (27 Oct 2022 05:30) (17 - 17)  SpO2: 99% (27 Oct 2022 05:30) (99% - 99%)    Parameters below as of 27 Oct 2022 05:30  Patient On (Oxygen Delivery Method): room air      GENERAL: No acute distress, well-developed  HEAD:  Atraumatic, Normocephalic  EYES: EOM grossly intact,  conjunctiva and sclera clear, PERRL  CHEST/LUNG: CTAB; No wheezes, rales, or rhonchi  HEART: Slow rate and seemingly regular rhythm; No murmurs, rubs, or gallops  ABDOMEN: Soft, non-tender, non-distended; normal bowel sounds, no organomegaly  EXTREMITIES:  No clubbing, cyanosis, or edema. LLE bandaged   NEUROLOGY: A&O x 3, no focal deficits  SKIN: No rashes or lesions      MEDICATIONS  (STANDING):  apixaban 2.5 milliGRAM(s) Oral two times a day  atorvastatin 80 milliGRAM(s) Oral at bedtime  brimonidine 0.2% Ophthalmic Solution 1 Drop(s) Both EYES three times a day  dorzolamide 2% Ophthalmic Solution 1 Drop(s) Both EYES three times a day  gabapentin 100 milliGRAM(s) Oral three times a day  ketorolac 0.5% Ophthalmic Solution 1 Drop(s) Both EYES at bedtime  levothyroxine 50 MICROGram(s) Oral daily  melatonin 3 milliGRAM(s) Oral at bedtime  multivitamin 1 Tablet(s) Oral daily  predniSONE   Tablet 5 milliGRAM(s) Oral daily  senna 2 Tablet(s) Oral at bedtime    MEDICATIONS  (PRN):  acetaminophen     Tablet .. 650 milliGRAM(s) Oral every 6 hours PRN Temp greater or equal to 38C (100.4F), Mild Pain (1 - 3), Moderate Pain (4 - 6)        I&O's Summary    26 Oct 2022 07:01  -  27 Oct 2022 07:00  --------------------------------------------------------  IN: 0 mL / OUT: 850 mL / NET: -850 mL          LABS:  CAPILLARY BLOOD GLUCOSE                              8.3    9.91  )-----------( 399      ( 27 Oct 2022 06:15 )             27.0     10-26    137  |  107  |  34<H>  ----------------------------<  92  3.7   |  19<L>  |  0.86    Ca    8.9      26 Oct 2022 05:30  Phos  3.0     10-26  Mg     1.90     10-26                      RADIOLOGY & ADDITIONAL TESTS:      COORDINATION OF CARE:

## 2022-10-27 NOTE — PROGRESS NOTE ADULT - PROBLEM SELECTOR PLAN 1
Picture in Surgery Consult note 10/19/22  S/P Debridement with surgery  Wound care consulted  Surgery signed off  S/P 2 units blood  HH (10/26)8.0<-7.7<-7.5<-Xfuse<-6.8-<7.3<-7.5<-7.7<7.2<9.3 (10/20)      Plan  -Plastic surgeon Dr. Wick called and shown pictures of initial and current wound. Rec vac to start at [place] and continued wound care for eventual plastics closure  -Daily dressing changes  -Trend HH.

## 2022-10-27 NOTE — PROGRESS NOTE ADULT - ASSESSMENT
87F PMHx afib (eliquis), HLD, hypothyroid, bradycardia, leg + sacral ulcers presents from [place] with open bleeding wound.    Impression: Bleeding wound on Eliquis iso hx of leg ulcers and recent PT work

## 2022-10-28 ENCOUNTER — TRANSCRIPTION ENCOUNTER (OUTPATIENT)
Age: 87
End: 2022-10-28

## 2022-10-28 VITALS
HEART RATE: 50 BPM | OXYGEN SATURATION: 99 % | DIASTOLIC BLOOD PRESSURE: 68 MMHG | RESPIRATION RATE: 17 BRPM | SYSTOLIC BLOOD PRESSURE: 146 MMHG | TEMPERATURE: 98 F

## 2022-10-28 LAB
HCT VFR BLD CALC: 25.7 % — LOW (ref 34.5–45)
HGB BLD-MCNC: 7.9 G/DL — LOW (ref 11.5–15.5)
MCHC RBC-ENTMCNC: 27 PG — SIGNIFICANT CHANGE UP (ref 27–34)
MCHC RBC-ENTMCNC: 30.7 GM/DL — LOW (ref 32–36)
MCV RBC AUTO: 87.7 FL — SIGNIFICANT CHANGE UP (ref 80–100)
NRBC # BLD: 0 /100 WBCS — SIGNIFICANT CHANGE UP (ref 0–0)
NRBC # FLD: 0 K/UL — SIGNIFICANT CHANGE UP (ref 0–0)
PLATELET # BLD AUTO: 410 K/UL — HIGH (ref 150–400)
RBC # BLD: 2.93 M/UL — LOW (ref 3.8–5.2)
RBC # FLD: 16.1 % — HIGH (ref 10.3–14.5)
WBC # BLD: 9.55 K/UL — SIGNIFICANT CHANGE UP (ref 3.8–10.5)
WBC # FLD AUTO: 9.55 K/UL — SIGNIFICANT CHANGE UP (ref 3.8–10.5)

## 2022-10-28 PROCEDURE — 99239 HOSP IP/OBS DSCHRG MGMT >30: CPT

## 2022-10-28 RX ORDER — APIXABAN 2.5 MG/1
1 TABLET, FILM COATED ORAL
Qty: 0 | Refills: 0 | DISCHARGE
Start: 2022-10-28

## 2022-10-28 RX ORDER — APIXABAN 2.5 MG/1
5 TABLET, FILM COATED ORAL
Refills: 0 | Status: DISCONTINUED | OUTPATIENT
Start: 2022-10-28 | End: 2022-10-28

## 2022-10-28 RX ADMIN — Medication 5 MILLIGRAM(S): at 05:50

## 2022-10-28 RX ADMIN — GABAPENTIN 100 MILLIGRAM(S): 400 CAPSULE ORAL at 05:49

## 2022-10-28 RX ADMIN — DORZOLAMIDE HYDROCHLORIDE 1 DROP(S): 20 SOLUTION/ DROPS OPHTHALMIC at 05:49

## 2022-10-28 RX ADMIN — APIXABAN 2.5 MILLIGRAM(S): 2.5 TABLET, FILM COATED ORAL at 05:49

## 2022-10-28 RX ADMIN — BRIMONIDINE TARTRATE 1 DROP(S): 2 SOLUTION/ DROPS OPHTHALMIC at 05:49

## 2022-10-28 RX ADMIN — Medication 50 MICROGRAM(S): at 05:50

## 2022-10-28 NOTE — PROGRESS NOTE ADULT - ASSESSMENT
87F PMHx afib (eliquis), HLD, hypothyroid, bradycardia, leg + sacral ulcers presents from [place] with open bleeding wound.    Impression: Bleeding wound on Eliquis iso hx of leg ulcers and recent PT work

## 2022-10-28 NOTE — PROGRESS NOTE ADULT - PROBLEM SELECTOR PLAN 2
Hx of Afib on eliquis  not rate/ rhythm controlled iso chronic bradycardia    Plan:  -Hold Eliquis iso bleed  -CTM on tele
Hx of Afib on eliquis  not rate/ rhythm controlled iso chronic bradycardia    Plan:  -Resume 2.5 mg Eliquis BID today, discussed risks benefits with family   -CTM on tele
Hx of Afib on eliquis  not rate/ rhythm controlled iso chronic bradycardia    Plan:  -Hold Eliquis iso bleed  -CTM on tele
Hx of Afib on eliquis  not rate/ rhythm controlled iso chronic bradycardia    Plan:  -Increase to 5 mg Eliquis BID today  -CTM on tele
Hx of Afib on eliquis  not rate/ rhythm controlled iso chronic bradycardia    Plan:  -Hold Eliquis iso bleed  -CTM on tele
Hx of Afib on eliquis  not rate/ rhythm controlled iso chronic bradycardia    Plan:  -Resume 2.5 mg Eliquis BID today, discussed risks benefits with family   -CTM on tele

## 2022-10-28 NOTE — PROGRESS NOTE ADULT - PROBLEM SELECTOR PROBLEM 6
Chronic uveitis
Prophylactic measure
Chronic uveitis
Prophylactic measure
Chronic uveitis
Prophylactic measure
Chronic uveitis

## 2022-10-28 NOTE — PROGRESS NOTE ADULT - ATTENDING COMMENTS
Patient seen and examined at bedside. 86 y/o F with history of atrial fibrillation (on eliquis), HLD, hypothyroidism, bradycardia, leg and sacral wounds presenting from [facility] due to leg wound. Found to have LLE hematoma in setting of recent injury now s/p debridement by surgery on 10/19. Re-starting AC on 10/26. Plan for wound vac at [facility]     #LLE Wound/Hematoma  - now s/p beside debridement 10/19   - low concern for compartment syndrome as per surgery   - wound care eval noted, can be continued at [facility]  - plastics consulted - outpatient follow up with Dr. Valdez for wound closure  - Hgb stable  - wound with serosanguinous dc, but not c/f new hematoma per wound care   - Per plastics, plan for wound vac at [facility]    #Afib w/ Slow VR  - Pt CHADSVASC 3 - age and female  - restart Eliquis at 5mg po BID  - Can take off telemetry    Dispo; [facility] with wound vac likely today 10/28 -  35 minutes spent discharge planning.   Discussed with HS1

## 2022-10-28 NOTE — DISCHARGE NOTE NURSING/CASE MANAGEMENT/SOCIAL WORK - PATIENT PORTAL LINK FT
You can access the FollowMyHealth Patient Portal offered by [facility] by registering at the following website: [email or web address]. By joining [facility]’s FollowMyHealth portal, you will also be able to view your health information using other applications (apps) compatible with our system.

## 2022-10-28 NOTE — PROGRESS NOTE ADULT - REASON FOR ADMISSION
Hematoma

## 2022-10-28 NOTE — PROGRESS NOTE ADULT - PROBLEM SELECTOR PLAN 7
DV ppx: not indicated iso bleed and leg wounds     Dispo: Medically cleared for d/c back to rehab.
DV ppx: not indicated iso bleed and leg wounds     Dispo: Medically cleared for d/c back to rehab.
VONDA ppx: Eliquis    Dispo: Medically cleared for d/c back to rehab.
DV ppx: not indicated iso bleed and leg wounds     Dispo: Medically cleared for d/c back to rehab.

## 2022-10-28 NOTE — PROGRESS NOTE ADULT - SUBJECTIVE AND OBJECTIVE BOX
PROGRESS NOTE:       Patient is a 87y old  Female who presents with a chief complaint of Hematoma (27 Oct 2022 15:30)      SUBJECTIVE / OVERNIGHT EVENTS: NAEON.    ADDITIONAL REVIEW OF SYSTEMS: Denies new sx      PHYSICAL EXAM:  Vital Signs Last 24 Hrs  T(C): 36.8 (28 Oct 2022 05:45), Max: 36.8 (28 Oct 2022 05:45)  T(F): 98.2 (28 Oct 2022 05:45), Max: 98.2 (28 Oct 2022 05:45)  HR: 50 (28 Oct 2022 05:45) (48 - 55)  BP: 146/68 (28 Oct 2022 05:45) (145/67 - 167/61)  BP(mean): --  RR: 17 (28 Oct 2022 05:45) (17 - 18)  SpO2: 99% (28 Oct 2022 05:45) (99% - 100%)    Parameters below as of 28 Oct 2022 05:45  Patient On (Oxygen Delivery Method): room air        GENERAL: No acute distress, well-developed  HEAD:  Atraumatic, Normocephalic  EYES: EOM grossly intact,  conjunctiva and sclera clear  CHEST/LUNG: CTAB; No wheezes, rales, or rhonchi  HEART: Slow rate and seemingly regular rhythm; No murmurs, rubs, or gallops  ABDOMEN: Soft, non-tender, non-distended; normal bowel sounds, no organomegaly  EXTREMITIES:  No clubbing, cyanosis, or edema. LLE bandaged   NEUROLOGY: A&O x 3, no focal deficits  SKIN: No rashes or lesions      MEDICATIONS  (STANDING):  apixaban 5 milliGRAM(s) Oral two times a day  atorvastatin 80 milliGRAM(s) Oral at bedtime  brimonidine 0.2% Ophthalmic Solution 1 Drop(s) Both EYES three times a day  dorzolamide 2% Ophthalmic Solution 1 Drop(s) Both EYES three times a day  gabapentin 100 milliGRAM(s) Oral three times a day  ketorolac 0.5% Ophthalmic Solution 1 Drop(s) Both EYES at bedtime  levothyroxine 50 MICROGram(s) Oral daily  melatonin 3 milliGRAM(s) Oral at bedtime  multivitamin 1 Tablet(s) Oral daily  predniSONE   Tablet 5 milliGRAM(s) Oral daily  senna 2 Tablet(s) Oral at bedtime    MEDICATIONS  (PRN):  acetaminophen     Tablet .. 650 milliGRAM(s) Oral every 6 hours PRN Temp greater or equal to 38C (100.4F), Mild Pain (1 - 3), Moderate Pain (4 - 6)        I&O's Summary        LABS:  CAPILLARY BLOOD GLUCOSE                              7.9    9.55  )-----------( 410      ( 28 Oct 2022 05:36 )             25.7                           RADIOLOGY & ADDITIONAL TESTS:      COORDINATION OF CARE:

## 2022-10-28 NOTE — PROGRESS NOTE ADULT - PROVIDER SPECIALTY LIST ADULT
Electrophysiology
Internal Medicine
Surgery
Wound Care
Electrophysiology
Electrophysiology
Surgery
Internal Medicine

## 2022-10-28 NOTE — PROGRESS NOTE ADULT - PROBLEM SELECTOR PLAN 5
Chronic    Plan  -C/W Home synthroid

## 2022-10-28 NOTE — DISCHARGE NOTE NURSING/CASE MANAGEMENT/SOCIAL WORK - NSDCPEFALRISK_GEN_ALL_CORE
For information on Fall & Injury Prevention, visit: [email or web address] OR  [email or web address] OR  https://www.cdc.gov/steadi/patient.html

## 2022-10-28 NOTE — PROGRESS NOTE ADULT - PROBLEM SELECTOR PLAN 1
Picture in Surgery Consult note 10/19/22  S/P Debridement with surgery  Wound care consulted  Surgery signed off  S/P 2 units blood  HH (10/28)7.9<-8.3<-8.0<-7.7<-7.5<-Xfuse<-6.8-<7.3<-7.5<-7.7<7.2<9.3 (10/20)      Plan  -Plastic surgeon Dr. Wick called and shown pictures of initial and current wound. Rec vac to start at [place] and continued wound care for eventual plastics closure  -Daily dressing changes  -Trend HH. Xfuse if <7

## 2022-10-28 NOTE — PROGRESS NOTE ADULT - PROBLEM SELECTOR PLAN 3
Chronic  Asymptomatic  10/20: 8beats NSVT    Plan  -Monitor on tele
Chronic  Asymptomatic  10/20: 8beats NSVT    Plan  -Monitor on tele
Chronic  Asymptomatic    Plan  -Monitor on tele
Chronic  Asymptomatic  10/20: 8beats NSVT    Plan  -Monitor on tele
Chronic  Asymptomatic    Plan  -Monitor on tele

## 2022-10-28 NOTE — PROGRESS NOTE ADULT - PROBLEM SELECTOR PROBLEM 4
Sacral ulcer

## 2022-10-28 NOTE — PROGRESS NOTE ADULT - PROBLEM SELECTOR PLAN 6
-continue home meds including oral prednisone 5mg QD, Ketorolax .5% opthalmic qHS, Brimonidine 0.2% TID  -Pt takes Brinzolamide at home, therapuetic exchange for dorzolamide 2% TID

## 2022-10-28 NOTE — PROGRESS NOTE ADULT - PROBLEM SELECTOR PLAN 4
Decubitus ulcer overlying the distal sacrum/coccyx. No obvious bony erosion,    Plan  -WC recs in place  -CTM
Decubitus ulcer overlying the distal sacrum/coccyx. No obvious bony erosion,    Plan  -WC recs in place  -CTM
Decubitus ulcer overlying the distal sacrum/coccyx. No obvious bony erosion,    Plan  -F/U wound care reqs  -Consider MRI if concern for OM heightens  -CTM
Decubitus ulcer overlying the distal sacrum/coccyx. No obvious bony erosion,    Plan  -WC recs in place  -CTM
